# Patient Record
Sex: FEMALE | Race: WHITE | Employment: UNEMPLOYED | URBAN - METROPOLITAN AREA
[De-identification: names, ages, dates, MRNs, and addresses within clinical notes are randomized per-mention and may not be internally consistent; named-entity substitution may affect disease eponyms.]

---

## 2017-04-03 DIAGNOSIS — I10 ESSENTIAL HYPERTENSION: ICD-10-CM

## 2017-04-03 DIAGNOSIS — E78.2 MIXED HYPERLIPIDEMIA: ICD-10-CM

## 2017-04-03 DIAGNOSIS — E55.9 VITAMIN D DEFICIENCY: ICD-10-CM

## 2017-04-03 LAB
ALBUMIN SERPL-MCNC: 4.2 G/DL (ref 3.9–4.9)
ALP BLD-CCNC: 61 U/L (ref 40–130)
ALT SERPL-CCNC: 16 U/L (ref 0–33)
ANION GAP SERPL CALCULATED.3IONS-SCNC: 12 MEQ/L (ref 7–13)
AST SERPL-CCNC: 18 U/L (ref 0–35)
BILIRUB SERPL-MCNC: 0.3 MG/DL (ref 0–1.2)
BUN BLDV-MCNC: 26 MG/DL (ref 8–23)
CALCIUM SERPL-MCNC: 10.4 MG/DL (ref 8.6–10.2)
CHLORIDE BLD-SCNC: 98 MEQ/L (ref 98–107)
CHOLESTEROL, TOTAL: 251 MG/DL (ref 0–199)
CO2: 29 MEQ/L (ref 22–29)
CREAT SERPL-MCNC: 0.73 MG/DL (ref 0.5–0.9)
GFR AFRICAN AMERICAN: >60
GFR NON-AFRICAN AMERICAN: >60
GLOBULIN: 2.9 G/DL (ref 2.3–3.5)
GLUCOSE BLD-MCNC: 118 MG/DL (ref 74–109)
HCT VFR BLD CALC: 39.8 % (ref 37–47)
HDLC SERPL-MCNC: 53 MG/DL (ref 40–59)
HEMOGLOBIN: 13.4 G/DL (ref 12–16)
LDL CHOLESTEROL CALCULATED: 118 MG/DL (ref 0–129)
MCH RBC QN AUTO: 28.9 PG (ref 27–31.3)
MCHC RBC AUTO-ENTMCNC: 33.8 % (ref 33–37)
MCV RBC AUTO: 85.5 FL (ref 82–100)
PDW BLD-RTO: 14.6 % (ref 11.5–14.5)
PLATELET # BLD: 245 K/UL (ref 130–400)
POTASSIUM SERPL-SCNC: 4.9 MEQ/L (ref 3.5–5.1)
RBC # BLD: 4.65 M/UL (ref 4.2–5.4)
SODIUM BLD-SCNC: 139 MEQ/L (ref 132–144)
TOTAL PROTEIN: 7.1 G/DL (ref 6.4–8.1)
TRIGL SERPL-MCNC: 398 MG/DL (ref 0–200)
WBC # BLD: 9 K/UL (ref 4.8–10.8)

## 2017-04-03 RX ORDER — MULTIVIT-MIN/IRON/FOLIC ACID/K 18-600-40
CAPSULE ORAL
Qty: 90 CAPSULE | Refills: 3 | Status: SHIPPED | OUTPATIENT
Start: 2017-04-03 | End: 2019-03-07 | Stop reason: DRUGHIGH

## 2017-04-03 RX ORDER — LOSARTAN POTASSIUM 100 MG/1
TABLET ORAL
Qty: 90 TABLET | Refills: 3 | Status: SHIPPED | OUTPATIENT
Start: 2017-04-03 | End: 2018-04-02 | Stop reason: SDUPTHER

## 2017-04-11 ENCOUNTER — OFFICE VISIT (OUTPATIENT)
Dept: FAMILY MEDICINE CLINIC | Age: 62
End: 2017-04-11

## 2017-04-11 VITALS
RESPIRATION RATE: 18 BRPM | HEART RATE: 78 BPM | BODY MASS INDEX: 40.12 KG/M2 | DIASTOLIC BLOOD PRESSURE: 60 MMHG | SYSTOLIC BLOOD PRESSURE: 122 MMHG | HEIGHT: 62 IN | WEIGHT: 218 LBS | TEMPERATURE: 97.6 F

## 2017-04-11 DIAGNOSIS — E55.9 VITAMIN D DEFICIENCY: ICD-10-CM

## 2017-04-11 DIAGNOSIS — Z78.9 STATIN INTOLERANCE: ICD-10-CM

## 2017-04-11 DIAGNOSIS — E78.2 MIXED HYPERLIPIDEMIA: ICD-10-CM

## 2017-04-11 DIAGNOSIS — H81.13 BENIGN POSITIONAL VERTIGO, BILATERAL: ICD-10-CM

## 2017-04-11 DIAGNOSIS — I10 ESSENTIAL HYPERTENSION: Primary | ICD-10-CM

## 2017-04-11 DIAGNOSIS — I25.10 CHRONIC CORONARY ARTERY DISEASE: ICD-10-CM

## 2017-04-11 DIAGNOSIS — K21.9 GASTROESOPHAGEAL REFLUX DISEASE WITHOUT ESOPHAGITIS: ICD-10-CM

## 2017-04-11 PROCEDURE — 99214 OFFICE O/P EST MOD 30 MIN: CPT | Performed by: FAMILY MEDICINE

## 2017-04-11 RX ORDER — FAMOTIDINE 40 MG/1
40 TABLET, FILM COATED ORAL EVERY EVENING
Qty: 30 TABLET | Refills: 3 | Status: SHIPPED | OUTPATIENT
Start: 2017-04-11 | End: 2017-07-29 | Stop reason: SDUPTHER

## 2017-04-11 ASSESSMENT — PATIENT HEALTH QUESTIONNAIRE - PHQ9
1. LITTLE INTEREST OR PLEASURE IN DOING THINGS: 0
SUM OF ALL RESPONSES TO PHQ9 QUESTIONS 1 & 2: 0
SUM OF ALL RESPONSES TO PHQ QUESTIONS 1-9: 0
2. FEELING DOWN, DEPRESSED OR HOPELESS: 0

## 2017-04-18 ENCOUNTER — OFFICE VISIT (OUTPATIENT)
Dept: CARDIOLOGY | Age: 62
End: 2017-04-18

## 2017-04-18 VITALS
HEART RATE: 80 BPM | BODY MASS INDEX: 41.01 KG/M2 | WEIGHT: 222.85 LBS | OXYGEN SATURATION: 95 % | DIASTOLIC BLOOD PRESSURE: 84 MMHG | HEIGHT: 62 IN | SYSTOLIC BLOOD PRESSURE: 136 MMHG | RESPIRATION RATE: 12 BRPM

## 2017-04-18 DIAGNOSIS — I25.10 CHRONIC CORONARY ARTERY DISEASE: Primary | ICD-10-CM

## 2017-04-18 DIAGNOSIS — E78.2 MIXED HYPERLIPIDEMIA: ICD-10-CM

## 2017-04-18 DIAGNOSIS — R07.2 PRECORDIAL PAIN: ICD-10-CM

## 2017-04-18 DIAGNOSIS — I10 ESSENTIAL HYPERTENSION: ICD-10-CM

## 2017-04-18 DIAGNOSIS — E66.9 OBESITY (BMI 30-39.9): ICD-10-CM

## 2017-04-18 PROCEDURE — 93000 ELECTROCARDIOGRAM COMPLETE: CPT | Performed by: INTERNAL MEDICINE

## 2017-04-18 PROCEDURE — 99204 OFFICE O/P NEW MOD 45 MIN: CPT | Performed by: INTERNAL MEDICINE

## 2017-04-19 ASSESSMENT — ENCOUNTER SYMPTOMS
CONSTIPATION: 0
NAUSEA: 1
TROUBLE SWALLOWING: 0
CHEST TIGHTNESS: 0
COUGH: 0
BLOOD IN STOOL: 0
DIARRHEA: 0
SHORTNESS OF BREATH: 0
VOMITING: 0
ABDOMINAL PAIN: 0

## 2017-05-23 ENCOUNTER — HOSPITAL ENCOUNTER (OUTPATIENT)
Dept: NON INVASIVE DIAGNOSTICS | Age: 62
Discharge: HOME | End: 2017-05-23
Payer: COMMERCIAL

## 2017-05-23 VITALS — BODY MASS INDEX: 40.6 KG/M2 | WEIGHT: 222 LBS

## 2017-05-23 DIAGNOSIS — R07.2 PRECORDIAL PAIN: ICD-10-CM

## 2017-05-23 LAB
LV EF: 59 %
LVEF MODALITY: NORMAL

## 2017-05-23 PROCEDURE — 2580000003 HC RX 258

## 2017-05-23 PROCEDURE — 93017 CV STRESS TEST TRACING ONLY: CPT

## 2017-05-23 PROCEDURE — 93306 TTE W/DOPPLER COMPLETE: CPT

## 2017-05-23 RX ORDER — DOBUTAMINE HYDROCHLORIDE 100 MG/100ML
2.5 INJECTION INTRAVENOUS CONTINUOUS
Status: DISPENSED | OUTPATIENT
Start: 2017-05-23 | End: 2017-05-23

## 2017-05-26 DIAGNOSIS — I10 ESSENTIAL HYPERTENSION: ICD-10-CM

## 2017-05-26 RX ORDER — CHLORTHALIDONE 25 MG/1
TABLET ORAL
Qty: 90 TABLET | Refills: 3 | Status: SHIPPED | OUTPATIENT
Start: 2017-05-26 | End: 2018-06-05 | Stop reason: SDUPTHER

## 2017-05-30 ENCOUNTER — OFFICE VISIT (OUTPATIENT)
Dept: CARDIOLOGY | Age: 62
End: 2017-05-30

## 2017-05-30 VITALS
DIASTOLIC BLOOD PRESSURE: 60 MMHG | HEART RATE: 91 BPM | HEIGHT: 62 IN | BODY MASS INDEX: 40.85 KG/M2 | OXYGEN SATURATION: 98 % | WEIGHT: 222 LBS | SYSTOLIC BLOOD PRESSURE: 120 MMHG

## 2017-05-30 DIAGNOSIS — I10 ESSENTIAL HYPERTENSION: ICD-10-CM

## 2017-05-30 DIAGNOSIS — E66.9 OBESITY (BMI 30-39.9): Primary | ICD-10-CM

## 2017-05-30 DIAGNOSIS — I25.10 CHRONIC CORONARY ARTERY DISEASE: ICD-10-CM

## 2017-05-30 DIAGNOSIS — E78.2 MIXED HYPERLIPIDEMIA: ICD-10-CM

## 2017-05-30 PROCEDURE — 99213 OFFICE O/P EST LOW 20 MIN: CPT | Performed by: INTERNAL MEDICINE

## 2017-05-30 RX ORDER — CARVEDILOL 25 MG/1
25 TABLET ORAL 2 TIMES DAILY WITH MEALS
Qty: 60 TABLET | Refills: 5 | Status: SHIPPED | OUTPATIENT
Start: 2017-05-30 | End: 2017-11-30 | Stop reason: SDUPTHER

## 2017-06-07 ENCOUNTER — OFFICE VISIT (OUTPATIENT)
Dept: FAMILY MEDICINE CLINIC | Age: 62
End: 2017-06-07

## 2017-06-07 VITALS
WEIGHT: 222 LBS | TEMPERATURE: 97.8 F | RESPIRATION RATE: 18 BRPM | OXYGEN SATURATION: 98 % | DIASTOLIC BLOOD PRESSURE: 80 MMHG | BODY MASS INDEX: 40.85 KG/M2 | HEIGHT: 62 IN | SYSTOLIC BLOOD PRESSURE: 136 MMHG | HEART RATE: 93 BPM

## 2017-06-07 DIAGNOSIS — L02.01 ACUTE ABSCESS OF FACE: Primary | ICD-10-CM

## 2017-06-07 PROCEDURE — 99213 OFFICE O/P EST LOW 20 MIN: CPT | Performed by: NURSE PRACTITIONER

## 2017-06-07 RX ORDER — DOXYCYCLINE 100 MG/1
100 CAPSULE ORAL 2 TIMES DAILY
Qty: 20 CAPSULE | Refills: 0 | Status: SHIPPED | OUTPATIENT
Start: 2017-06-07 | End: 2017-06-17

## 2017-06-12 ENCOUNTER — OFFICE VISIT (OUTPATIENT)
Dept: SURGERY | Age: 62
End: 2017-06-12

## 2017-06-12 VITALS
DIASTOLIC BLOOD PRESSURE: 84 MMHG | BODY MASS INDEX: 40.3 KG/M2 | HEIGHT: 62 IN | TEMPERATURE: 98 F | SYSTOLIC BLOOD PRESSURE: 120 MMHG | WEIGHT: 219 LBS

## 2017-06-12 DIAGNOSIS — L08.9 UNSPECIFIED LOCAL INFECTION OF SKIN AND SUBCUTANEOUS TISSUE: ICD-10-CM

## 2017-06-12 DIAGNOSIS — L72.9 CYST OF SKIN: Primary | ICD-10-CM

## 2017-06-12 PROCEDURE — 99202 OFFICE O/P NEW SF 15 MIN: CPT | Performed by: SURGERY

## 2017-06-12 RX ORDER — DOXYCYCLINE HYCLATE 100 MG/1
CAPSULE ORAL
Qty: 28 CAPSULE | Refills: 0 | Status: SHIPPED | OUTPATIENT
Start: 2017-06-12 | End: 2017-08-09 | Stop reason: ALTCHOICE

## 2017-06-12 ASSESSMENT — ENCOUNTER SYMPTOMS
ABDOMINAL PAIN: 0
COLOR CHANGE: 0
RESPIRATORY NEGATIVE: 1
SHORTNESS OF BREATH: 0
BLOOD IN STOOL: 0
RHINORRHEA: 0
EYES NEGATIVE: 1
ABDOMINAL DISTENTION: 0
CHEST TIGHTNESS: 0
NAUSEA: 0
RECTAL PAIN: 0
ALLERGIC/IMMUNOLOGIC NEGATIVE: 1
GASTROINTESTINAL NEGATIVE: 1

## 2017-07-29 DIAGNOSIS — K21.9 GASTROESOPHAGEAL REFLUX DISEASE WITHOUT ESOPHAGITIS: ICD-10-CM

## 2017-07-29 RX ORDER — FAMOTIDINE 40 MG/1
TABLET, FILM COATED ORAL
Qty: 30 TABLET | Refills: 3 | Status: SHIPPED | OUTPATIENT
Start: 2017-07-29 | End: 2018-05-31 | Stop reason: ALTCHOICE

## 2017-08-04 DIAGNOSIS — R73.02 IMPAIRED GLUCOSE TOLERANCE: ICD-10-CM

## 2017-08-04 DIAGNOSIS — E55.9 VITAMIN D DEFICIENCY: ICD-10-CM

## 2017-08-04 DIAGNOSIS — E78.2 MIXED HYPERLIPIDEMIA: ICD-10-CM

## 2017-08-04 DIAGNOSIS — I10 ESSENTIAL HYPERTENSION: ICD-10-CM

## 2017-08-04 DIAGNOSIS — E78.2 MIXED HYPERLIPIDEMIA: Primary | ICD-10-CM

## 2017-08-04 LAB
ALBUMIN SERPL-MCNC: 4.3 G/DL (ref 3.9–4.9)
ALP BLD-CCNC: 59 U/L (ref 40–130)
ALT SERPL-CCNC: 24 U/L (ref 0–33)
ANION GAP SERPL CALCULATED.3IONS-SCNC: 18 MEQ/L (ref 7–13)
AST SERPL-CCNC: 21 U/L (ref 0–35)
BASOPHILS ABSOLUTE: 0.1 K/UL (ref 0–0.2)
BASOPHILS RELATIVE PERCENT: 0.7 %
BILIRUB SERPL-MCNC: 0.4 MG/DL (ref 0–1.2)
BUN BLDV-MCNC: 14 MG/DL (ref 8–23)
CALCIUM SERPL-MCNC: 10.1 MG/DL (ref 8.6–10.2)
CHLORIDE BLD-SCNC: 95 MEQ/L (ref 98–107)
CHOLESTEROL, TOTAL: 236 MG/DL (ref 0–199)
CO2: 26 MEQ/L (ref 22–29)
CREAT SERPL-MCNC: 0.67 MG/DL (ref 0.5–0.9)
EOSINOPHILS ABSOLUTE: 0.5 K/UL (ref 0–0.7)
EOSINOPHILS RELATIVE PERCENT: 4.3 %
GFR AFRICAN AMERICAN: >60
GFR NON-AFRICAN AMERICAN: >60
GLOBULIN: 2.7 G/DL (ref 2.3–3.5)
GLUCOSE BLD-MCNC: 131 MG/DL (ref 74–109)
HCT VFR BLD CALC: 40.1 % (ref 37–47)
HDLC SERPL-MCNC: 49 MG/DL (ref 40–59)
HEMOGLOBIN: 13.3 G/DL (ref 12–16)
LDL CHOLESTEROL CALCULATED: 121 MG/DL (ref 0–129)
LYMPHOCYTES ABSOLUTE: 3.3 K/UL (ref 1–4.8)
LYMPHOCYTES RELATIVE PERCENT: 30.6 %
MCH RBC QN AUTO: 28.9 PG (ref 27–31.3)
MCHC RBC AUTO-ENTMCNC: 33.2 % (ref 33–37)
MCV RBC AUTO: 87 FL (ref 82–100)
MONOCYTES ABSOLUTE: 0.8 K/UL (ref 0.2–0.8)
MONOCYTES RELATIVE PERCENT: 7.5 %
NEUTROPHILS ABSOLUTE: 6.1 K/UL (ref 1.4–6.5)
NEUTROPHILS RELATIVE PERCENT: 56.9 %
PDW BLD-RTO: 15.1 % (ref 11.5–14.5)
PLATELET # BLD: 252 K/UL (ref 130–400)
POTASSIUM SERPL-SCNC: 4.2 MEQ/L (ref 3.5–5.1)
RBC # BLD: 4.61 M/UL (ref 4.2–5.4)
SODIUM BLD-SCNC: 139 MEQ/L (ref 132–144)
TOTAL PROTEIN: 7 G/DL (ref 6.4–8.1)
TRIGL SERPL-MCNC: 332 MG/DL (ref 0–200)
VITAMIN D 25-HYDROXY: 37.6 NG/ML (ref 30–100)
WBC # BLD: 10.7 K/UL (ref 4.8–10.8)

## 2017-08-09 ENCOUNTER — TELEPHONE (OUTPATIENT)
Dept: FAMILY MEDICINE CLINIC | Age: 62
End: 2017-08-09

## 2017-08-09 ENCOUNTER — HOSPITAL ENCOUNTER (OUTPATIENT)
Dept: GENERAL RADIOLOGY | Age: 62
Discharge: HOME | End: 2017-08-09
Payer: COMMERCIAL

## 2017-08-09 ENCOUNTER — OFFICE VISIT (OUTPATIENT)
Dept: FAMILY MEDICINE CLINIC | Age: 62
End: 2017-08-09

## 2017-08-09 VITALS
HEIGHT: 62 IN | BODY MASS INDEX: 40.12 KG/M2 | WEIGHT: 218 LBS | HEART RATE: 72 BPM | RESPIRATION RATE: 18 BRPM | TEMPERATURE: 99.1 F | SYSTOLIC BLOOD PRESSURE: 110 MMHG | DIASTOLIC BLOOD PRESSURE: 64 MMHG

## 2017-08-09 DIAGNOSIS — I10 ESSENTIAL HYPERTENSION: Primary | ICD-10-CM

## 2017-08-09 DIAGNOSIS — G47.33 OBSTRUCTIVE SLEEP APNEA SYNDROME: ICD-10-CM

## 2017-08-09 DIAGNOSIS — G89.29 CHRONIC MIDLINE LOW BACK PAIN WITH LEFT-SIDED SCIATICA: ICD-10-CM

## 2017-08-09 DIAGNOSIS — M54.42 CHRONIC MIDLINE LOW BACK PAIN WITH LEFT-SIDED SCIATICA: ICD-10-CM

## 2017-08-09 DIAGNOSIS — R73.9 HYPERGLYCEMIA: ICD-10-CM

## 2017-08-09 DIAGNOSIS — E78.2 MIXED HYPERLIPIDEMIA: ICD-10-CM

## 2017-08-09 DIAGNOSIS — Z12.39 SCREENING FOR BREAST CANCER: Primary | ICD-10-CM

## 2017-08-09 PROCEDURE — 72110 X-RAY EXAM L-2 SPINE 4/>VWS: CPT

## 2017-08-09 PROCEDURE — 99214 OFFICE O/P EST MOD 30 MIN: CPT | Performed by: FAMILY MEDICINE

## 2017-10-26 ENCOUNTER — OFFICE VISIT (OUTPATIENT)
Dept: FAMILY MEDICINE CLINIC | Age: 62
End: 2017-10-26

## 2017-10-26 VITALS
DIASTOLIC BLOOD PRESSURE: 82 MMHG | TEMPERATURE: 98.5 F | HEART RATE: 80 BPM | WEIGHT: 211 LBS | BODY MASS INDEX: 38.59 KG/M2 | RESPIRATION RATE: 14 BRPM | SYSTOLIC BLOOD PRESSURE: 122 MMHG

## 2017-10-26 DIAGNOSIS — Z13.1 ENCOUNTER FOR SCREENING FOR DIABETES MELLITUS: ICD-10-CM

## 2017-10-26 DIAGNOSIS — Z23 NEED FOR INFLUENZA VACCINATION: ICD-10-CM

## 2017-10-26 DIAGNOSIS — L03.311 CELLULITIS OF ABDOMINAL WALL: Primary | ICD-10-CM

## 2017-10-26 LAB — HBA1C MFR BLD: 5 %

## 2017-10-26 PROCEDURE — 3017F COLORECTAL CA SCREEN DOC REV: CPT | Performed by: INTERNAL MEDICINE

## 2017-10-26 PROCEDURE — 90688 IIV4 VACCINE SPLT 0.5 ML IM: CPT | Performed by: INTERNAL MEDICINE

## 2017-10-26 PROCEDURE — 1036F TOBACCO NON-USER: CPT | Performed by: INTERNAL MEDICINE

## 2017-10-26 PROCEDURE — 99213 OFFICE O/P EST LOW 20 MIN: CPT | Performed by: INTERNAL MEDICINE

## 2017-10-26 PROCEDURE — 3014F SCREEN MAMMO DOC REV: CPT | Performed by: INTERNAL MEDICINE

## 2017-10-26 PROCEDURE — G8598 ASA/ANTIPLAT THER USED: HCPCS | Performed by: INTERNAL MEDICINE

## 2017-10-26 PROCEDURE — G8427 DOCREV CUR MEDS BY ELIG CLIN: HCPCS | Performed by: INTERNAL MEDICINE

## 2017-10-26 PROCEDURE — G8484 FLU IMMUNIZE NO ADMIN: HCPCS | Performed by: INTERNAL MEDICINE

## 2017-10-26 PROCEDURE — 83036 HEMOGLOBIN GLYCOSYLATED A1C: CPT | Performed by: INTERNAL MEDICINE

## 2017-10-26 PROCEDURE — G8417 CALC BMI ABV UP PARAM F/U: HCPCS | Performed by: INTERNAL MEDICINE

## 2017-10-26 PROCEDURE — 90471 IMMUNIZATION ADMIN: CPT | Performed by: INTERNAL MEDICINE

## 2017-10-26 RX ORDER — DOXYCYCLINE HYCLATE 100 MG
100 TABLET ORAL 2 TIMES DAILY
Qty: 14 TABLET | Refills: 0 | Status: SHIPPED | OUTPATIENT
Start: 2017-10-26 | End: 2017-11-02

## 2017-10-26 ASSESSMENT — ENCOUNTER SYMPTOMS
SHORTNESS OF BREATH: 0
WHEEZING: 0
BLOOD IN STOOL: 0
DIARRHEA: 0
ABDOMINAL PAIN: 0
COUGH: 0
VOICE CHANGE: 0
NAUSEA: 0
COLOR CHANGE: 0
BACK PAIN: 0
ABDOMINAL DISTENTION: 0
PHOTOPHOBIA: 0
SORE THROAT: 0
EYE DISCHARGE: 0
EYE ITCHING: 0
EYE PAIN: 0
EYE REDNESS: 0
TROUBLE SWALLOWING: 0
CONSTIPATION: 0

## 2017-10-26 NOTE — PROGRESS NOTES
Adhesive tape  Patient Active Problem List   Diagnosis    Vitamin D deficiency    Tear of lateral cartilage or meniscus of knee, current    Status post coronary artery balloon dilation    Surgery follow-up examination    Seborrheic keratosis    Obstructive sleep apnea syndrome    Obesity (BMI 30-39. 9)    Long term current use of anticoagulant therapy    Hyperlipidemia    Hypertension    Impaired glucose tolerance    Gastroesophageal reflux disease    Factor V Leiden mutation ([facility] [address]    Essential tremor    Stricture of esophagus    Deep vein thrombosis of lower extremity (HCC)    Chronic coronary artery disease    Benign paroxysmal positional vertigo    Benign neoplasm of scalp and skin of neck    Gastroesophageal reflux disease without esophagitis    Cyst of skin    Local infection of skin and subcutaneous tissue     Current Outpatient Prescriptions on File Prior to Visit   Medication Sig Dispense Refill    famotidine (PEPCID) 40 MG tablet TAKE ONE TABLET BY MOUTH ONCE DAILY IN THE EVENING 30 tablet 3    Multiple Vitamin (MULTI VITAMIN PO) Take by mouth      carvedilol (COREG) 25 MG tablet Take 1 tablet by mouth 2 times daily (with meals) 60 tablet 5    chlorthalidone (HYGROTON) 25 MG tablet TAKE ONE TABLET BY MOUTH ONCE DAILY 90 tablet 3    TURMERIC PO Take by mouth      aspirin 81 MG tablet Take 81 mg by mouth daily      losartan (COZAAR) 100 MG tablet TAKE ONE TABLET BY MOUTH ONCE DAILY 90 tablet 3    Cholecalciferol (VITAMIN D) 2000 UNITS CAPS capsule TAKE ONE SOFTGEL BY MOUTH ONCE DAILY 90 capsule 3    nitroGLYCERIN (NITROSTAT) 0.4 MG SL tablet Place 1 tablet under the tongue every 5 minutes as needed for Chest pain 25 tablet 3     No current facility-administered medications on file prior to visit. Review of Systems   Constitutional: Negative for activity change, appetite change, chills, diaphoresis, fatigue, fever and unexpected weight change.    HENT: Negative for (VIBRA-TABS) 100 MG tablet   2. Need for influenza vaccination  INFLUENZA, QUADV, 3 YRS AND OLDER, IM, MDV, 0.5ML ([facility])   3. Encounter for screening for diabetes mellitus  POCT glycosylated hemoglobin (Hb A1C)    CANCELED: Glucose, Fasting    CANCELED: Hemoglobin A1C - NOT COVERED /DO NOT USE FOR MEDICARE PATIENTS         Plan:      Orders Placed This Encounter   Procedures    INFLUENZA, QUADV, 3 YRS AND OLDER, IM, MDV, 0.5ML (FLUZONE QUADV)    POCT glycosylated hemoglobin (Hb A1C)     Orders Placed This Encounter   Medications    doxycycline hyclate (VIBRA-TABS) 100 MG tablet     Sig: Take 1 tablet by mouth 2 times daily for 7 days     Dispense:  14 tablet     Refill:  0     Results for POC orders placed in visit on 10/26/17   POCT glycosylated hemoglobin (Hb A1C)   Result Value Ref Range    Hemoglobin A1C 5.0 %       Return if symptoms worsen or fail to improve.

## 2017-11-09 DIAGNOSIS — I10 ESSENTIAL HYPERTENSION: ICD-10-CM

## 2017-11-09 DIAGNOSIS — E78.2 MIXED HYPERLIPIDEMIA: ICD-10-CM

## 2017-11-09 LAB
ALBUMIN SERPL-MCNC: 4.2 G/DL (ref 3.9–4.9)
ALP BLD-CCNC: 62 U/L (ref 40–130)
ALT SERPL-CCNC: 36 U/L (ref 0–33)
ANION GAP SERPL CALCULATED.3IONS-SCNC: 14 MEQ/L (ref 7–13)
AST SERPL-CCNC: 33 U/L (ref 0–35)
BASOPHILS ABSOLUTE: 0.1 K/UL (ref 0–0.2)
BASOPHILS RELATIVE PERCENT: 0.7 %
BILIRUB SERPL-MCNC: 0.4 MG/DL (ref 0–1.2)
BUN BLDV-MCNC: 16 MG/DL (ref 8–23)
CALCIUM SERPL-MCNC: 10.3 MG/DL (ref 8.6–10.2)
CHLORIDE BLD-SCNC: 99 MEQ/L (ref 98–107)
CHOLESTEROL, TOTAL: 220 MG/DL (ref 0–199)
CO2: 30 MEQ/L (ref 22–29)
CREAT SERPL-MCNC: 0.59 MG/DL (ref 0.5–0.9)
EOSINOPHILS ABSOLUTE: 0.4 K/UL (ref 0–0.7)
EOSINOPHILS RELATIVE PERCENT: 4.2 %
GFR AFRICAN AMERICAN: >60
GFR NON-AFRICAN AMERICAN: >60
GLOBULIN: 2.9 G/DL (ref 2.3–3.5)
GLUCOSE BLD-MCNC: 119 MG/DL (ref 74–109)
HCT VFR BLD CALC: 39.6 % (ref 37–47)
HDLC SERPL-MCNC: 47 MG/DL (ref 40–59)
HEMOGLOBIN: 13.3 G/DL (ref 12–16)
LDL CHOLESTEROL CALCULATED: 118 MG/DL (ref 0–129)
LYMPHOCYTES ABSOLUTE: 3.7 K/UL (ref 1–4.8)
LYMPHOCYTES RELATIVE PERCENT: 41.1 %
MCH RBC QN AUTO: 30 PG (ref 27–31.3)
MCHC RBC AUTO-ENTMCNC: 33.7 % (ref 33–37)
MCV RBC AUTO: 89.1 FL (ref 82–100)
MONOCYTES ABSOLUTE: 0.7 K/UL (ref 0.2–0.8)
MONOCYTES RELATIVE PERCENT: 7.6 %
NEUTROPHILS ABSOLUTE: 4.2 K/UL (ref 1.4–6.5)
NEUTROPHILS RELATIVE PERCENT: 46.4 %
PDW BLD-RTO: 14.8 % (ref 11.5–14.5)
PLATELET # BLD: 266 K/UL (ref 130–400)
POTASSIUM SERPL-SCNC: 4.2 MEQ/L (ref 3.5–5.1)
RBC # BLD: 4.44 M/UL (ref 4.2–5.4)
SODIUM BLD-SCNC: 143 MEQ/L (ref 132–144)
TOTAL PROTEIN: 7.1 G/DL (ref 6.4–8.1)
TRIGL SERPL-MCNC: 276 MG/DL (ref 0–200)
WBC # BLD: 9.1 K/UL (ref 4.8–10.8)

## 2017-11-13 ENCOUNTER — OFFICE VISIT (OUTPATIENT)
Dept: FAMILY MEDICINE CLINIC | Age: 62
End: 2017-11-13

## 2017-11-13 VITALS
BODY MASS INDEX: 38.59 KG/M2 | RESPIRATION RATE: 18 BRPM | WEIGHT: 211 LBS | HEART RATE: 84 BPM | SYSTOLIC BLOOD PRESSURE: 136 MMHG | TEMPERATURE: 98.8 F | DIASTOLIC BLOOD PRESSURE: 82 MMHG

## 2017-11-13 DIAGNOSIS — L98.491 ULCER OF ABDOMEN WALL, LIMITED TO BREAKDOWN OF SKIN (HCC): ICD-10-CM

## 2017-11-13 DIAGNOSIS — K21.9 GASTROESOPHAGEAL REFLUX DISEASE WITHOUT ESOPHAGITIS: ICD-10-CM

## 2017-11-13 DIAGNOSIS — E78.2 MIXED HYPERLIPIDEMIA: ICD-10-CM

## 2017-11-13 DIAGNOSIS — I10 ESSENTIAL HYPERTENSION: Primary | ICD-10-CM

## 2017-11-13 PROCEDURE — 3014F SCREEN MAMMO DOC REV: CPT | Performed by: FAMILY MEDICINE

## 2017-11-13 PROCEDURE — 3017F COLORECTAL CA SCREEN DOC REV: CPT | Performed by: FAMILY MEDICINE

## 2017-11-13 PROCEDURE — G8484 FLU IMMUNIZE NO ADMIN: HCPCS | Performed by: FAMILY MEDICINE

## 2017-11-13 PROCEDURE — G8417 CALC BMI ABV UP PARAM F/U: HCPCS | Performed by: FAMILY MEDICINE

## 2017-11-13 PROCEDURE — 99214 OFFICE O/P EST MOD 30 MIN: CPT | Performed by: FAMILY MEDICINE

## 2017-11-13 PROCEDURE — G8427 DOCREV CUR MEDS BY ELIG CLIN: HCPCS | Performed by: FAMILY MEDICINE

## 2017-11-13 PROCEDURE — G8598 ASA/ANTIPLAT THER USED: HCPCS | Performed by: FAMILY MEDICINE

## 2017-11-13 PROCEDURE — 1036F TOBACCO NON-USER: CPT | Performed by: FAMILY MEDICINE

## 2017-11-13 NOTE — PROGRESS NOTES
edema in the four extremities. Pulses palpable at both posterior tibial and radial arteries. Psychiatric: She has a normal mood and affect. Her behavior is normal.       DIAGNOSIS:   1. Essential hypertension      Well controlled, continue current medication. 2. Mixed hyperlipidemia      Poor control, improved diet and exercise. 3. Gastroesophageal reflux disease without esophagitis      Well controlled, continue current medication. 4. Ulcer of abdomen wall, limited to breakdown of skin ([address]      Continue treatment, if symptoms don't improve may need wound center referral.     Plan for follow up: Follow up in 2 weeks for wound recheck. Other follow up as needed.       Electronically signed by Anay Beckman, 9:55 PM 11/15/17

## 2017-11-15 PROBLEM — L08.9 LOCAL INFECTION OF SKIN AND SUBCUTANEOUS TISSUE: Status: RESOLVED | Noted: 2017-06-12 | Resolved: 2017-11-15

## 2017-11-15 PROBLEM — L72.9 CYST OF SKIN: Status: RESOLVED | Noted: 2017-06-12 | Resolved: 2017-11-15

## 2017-11-27 ENCOUNTER — OFFICE VISIT (OUTPATIENT)
Dept: FAMILY MEDICINE CLINIC | Age: 62
End: 2017-11-27

## 2017-11-27 VITALS
DIASTOLIC BLOOD PRESSURE: 92 MMHG | BODY MASS INDEX: 38.41 KG/M2 | TEMPERATURE: 99 F | SYSTOLIC BLOOD PRESSURE: 170 MMHG | HEART RATE: 78 BPM | RESPIRATION RATE: 18 BRPM | WEIGHT: 210 LBS

## 2017-11-27 DIAGNOSIS — I10 ESSENTIAL HYPERTENSION: ICD-10-CM

## 2017-11-27 DIAGNOSIS — L98.491 ULCER OF ABDOMEN WALL, LIMITED TO BREAKDOWN OF SKIN (HCC): Primary | ICD-10-CM

## 2017-11-27 PROCEDURE — 1036F TOBACCO NON-USER: CPT | Performed by: FAMILY MEDICINE

## 2017-11-27 PROCEDURE — 3014F SCREEN MAMMO DOC REV: CPT | Performed by: FAMILY MEDICINE

## 2017-11-27 PROCEDURE — G8427 DOCREV CUR MEDS BY ELIG CLIN: HCPCS | Performed by: FAMILY MEDICINE

## 2017-11-27 PROCEDURE — 3017F COLORECTAL CA SCREEN DOC REV: CPT | Performed by: FAMILY MEDICINE

## 2017-11-27 PROCEDURE — G8417 CALC BMI ABV UP PARAM F/U: HCPCS | Performed by: FAMILY MEDICINE

## 2017-11-27 PROCEDURE — G8598 ASA/ANTIPLAT THER USED: HCPCS | Performed by: FAMILY MEDICINE

## 2017-11-27 PROCEDURE — 99213 OFFICE O/P EST LOW 20 MIN: CPT | Performed by: FAMILY MEDICINE

## 2017-11-27 PROCEDURE — G8484 FLU IMMUNIZE NO ADMIN: HCPCS | Performed by: FAMILY MEDICINE

## 2017-11-27 NOTE — PROGRESS NOTES
Chief Complaint   Patient presents with    Wound Check       HPI: Jie Farmer is a 60 y.o. female presenting for 2 week follow-up of wound on the inferior anterior abdominal wall. It is getting smaller. She says this is hurting and seeping. Her blood pressure is elevated. This patient reports no chest pains or pressure. There is no shortness of breath or chest wall soreness. EXAM:  Constitutional Blood pressure (!) 170/92, pulse 78, temperature 99 °F (37.2 °C), temperature source Temporal, resp. rate 18, weight 210 lb (95.3 kg). .   Physical Exam   Constitutional: She is oriented to person, place, and time. She appears well-developed and well-nourished. Cardiovascular: Normal rate, regular rhythm and normal heart sounds. Pulmonary/Chest: Effort normal and breath sounds normal.   Neurological: She is alert and oriented to person, place, and time. Skin:   Roughly round shaped skin ulcer abdominal wall, skin thickness. There is an eschar in the middle that is beginning to peel away. There is no induration or erythema. DIAGNOSIS:   1. Ulcer of abdomen wall, limited to breakdown of skin ([address]      Slowly improving, continue to monitor. 2. Essential hypertension      Poor control today, continue current medicines (patient has not had her medicine yet today). Monitor at home. Plan for follow up: Follow up in 6 weeks with blood work as ordered. Other follow up as needed.       Electronically signed by Mariajose Melchor, 9:37 PM 11/27/17

## 2017-11-30 ENCOUNTER — OFFICE VISIT (OUTPATIENT)
Dept: CARDIOLOGY | Age: 62
End: 2017-11-30

## 2017-11-30 VITALS
RESPIRATION RATE: 14 BRPM | OXYGEN SATURATION: 99 % | BODY MASS INDEX: 38.96 KG/M2 | HEART RATE: 86 BPM | DIASTOLIC BLOOD PRESSURE: 82 MMHG | WEIGHT: 213 LBS | SYSTOLIC BLOOD PRESSURE: 138 MMHG

## 2017-11-30 DIAGNOSIS — E66.9 OBESITY (BMI 30-39.9): ICD-10-CM

## 2017-11-30 DIAGNOSIS — Z95.5 STATUS POST CORONARY ARTERY BALLOON DILATION: ICD-10-CM

## 2017-11-30 DIAGNOSIS — I10 ESSENTIAL HYPERTENSION: ICD-10-CM

## 2017-11-30 DIAGNOSIS — E78.2 MIXED HYPERLIPIDEMIA: ICD-10-CM

## 2017-11-30 DIAGNOSIS — I25.10 CHRONIC CORONARY ARTERY DISEASE: Primary | ICD-10-CM

## 2017-11-30 DIAGNOSIS — G47.33 OBSTRUCTIVE SLEEP APNEA SYNDROME: ICD-10-CM

## 2017-11-30 PROCEDURE — 1036F TOBACCO NON-USER: CPT | Performed by: INTERNAL MEDICINE

## 2017-11-30 PROCEDURE — G8417 CALC BMI ABV UP PARAM F/U: HCPCS | Performed by: INTERNAL MEDICINE

## 2017-11-30 PROCEDURE — 93000 ELECTROCARDIOGRAM COMPLETE: CPT | Performed by: INTERNAL MEDICINE

## 2017-11-30 PROCEDURE — G8427 DOCREV CUR MEDS BY ELIG CLIN: HCPCS | Performed by: INTERNAL MEDICINE

## 2017-11-30 PROCEDURE — 99213 OFFICE O/P EST LOW 20 MIN: CPT | Performed by: INTERNAL MEDICINE

## 2017-11-30 PROCEDURE — G8484 FLU IMMUNIZE NO ADMIN: HCPCS | Performed by: INTERNAL MEDICINE

## 2017-11-30 PROCEDURE — 3017F COLORECTAL CA SCREEN DOC REV: CPT | Performed by: INTERNAL MEDICINE

## 2017-11-30 PROCEDURE — 3014F SCREEN MAMMO DOC REV: CPT | Performed by: INTERNAL MEDICINE

## 2017-11-30 PROCEDURE — G8598 ASA/ANTIPLAT THER USED: HCPCS | Performed by: INTERNAL MEDICINE

## 2017-11-30 RX ORDER — CARVEDILOL 25 MG/1
TABLET ORAL
Qty: 180 TABLET | Refills: 2 | Status: SHIPPED | OUTPATIENT
Start: 2017-11-30 | End: 2018-09-04 | Stop reason: SDUPTHER

## 2017-11-30 NOTE — PROGRESS NOTES
Chief Complaint   Patient presents with    6 Month Follow-Up    Coronary Artery Disease       Patient presents for initial medical evaluation. Patient is followed on a regular basis by Dr. Katie Perez MD. Hx of CAD s/p PCI in 2002 at [facility]. Has not seen a cardiologist for 5 yrs. NO recent stress test. States she had lower anterior rib pain the other day, does have some atypical CP here and there per patient. No smoking. She does have SOB with moderate exertion. Does have LE edema as well. compliant with meds. Pt denies  dyspnea,  change in exercise capacity, fatigue,  nausea, vomiting, diarrhea, constipation, motor weakness, insomnia, weight loss, syncope, dizziness, lightheadedness, palpitations, PND, orthopnea. States she has b/l leg pain all over. States she has bad knees. Does have a hx of statin intolerance. Has tried lipitor, zocor and Pracachol per patient. Does have a hx of DVT and factor V leiden and is on [address] [place], [place] [address]. 5-30-17:states her symptoms have resolved. Does have SOB real bad she states however. Pt denies chest pain, , change in exercise capacity, fatigue,  nausea, vomiting, diarrhea, constipation, motor weakness, insomnia, weight loss, syncope, dizziness, lightheadedness, palpitations, PND, orthopnea, or claudication. No nitro use. BP and hr are good. CAD is stable. No LE discoloration or ulcers. No LE edema. No CHF type symptoms. Lipid profile is normal. Tolerating livalo ok so far. States she is tired and would like to get off inderal.     S/p DSE:     RESULTS: Resting EKG revealed heart rate of 81 beats per minute. No  diagnostic ST-T wave changes were seen. Frequent PVCs were noted. At peak  infusion of IV dobutamine. Blood pressure response was hypertensive. Maximum  systolic blood pressure 010 mmHg, heart rate response was blunted. Maximum  heart rate was 107 beats per minute.  This may have been due to beta blocker  therapy.      RESULTS: Resting cardiogram showed normal valvular structures. Left  ventricular ejection fraction normal at 60%. Pericardium is normal.   Periaorta is normal. At peak infusion, IV dobutamine, there was normal  augmentation contractility of left ventricle without evidence of infarcted or  ischemic myocardium. There was incremental left ventricular ejection fraction  from 60% to 80% at peak infusion with IV dobutamine.     IMPRESSION:   1. Blunted heart response secondary to beta blocker therapy. 2. No definite evidence of infarcted or ischemic myocardium. 3. Augmentation of left ventricular fraction from 60% to 80% with exercise. 4. Frequent PVCs were noted. 5. Hypertensive response to IV Dobutamine.     11-30-17: Pt denies chest pain, dyspnea, dyspnea on exertion, change in exercise capacity, fatigue,  nausea, vomiting, diarrhea, constipation, motor weakness, insomnia, weight loss, syncope, dizziness, lightheadedness, palpitations, PND, orthopnea, or claudication. No nitro use. BP and hr are good. CAD is stable. No LE discoloration or ulcers. No LE edema. No CHF type symptoms. Lipid profile is normal. She did not tolerate Livalo as well, taking tumeric. No smoking. Felt better after taken off of inderal and switched to coreg. No nitro use. BP and hr are good. CAD is stable. No LE discoloration or ulcers. No LE edema. No CHF type symptoms. Lipid profile is abnormal. Non complaint with CPAP. Patient Active Problem List   Diagnosis    Vitamin D deficiency    Status post coronary artery balloon dilation    Obstructive sleep apnea syndrome    Obesity (BMI 30-39. 9)    Long term current use of anticoagulant therapy    Hyperlipidemia    Hypertension    Gastroesophageal reflux disease    Factor V Leiden mutation ([address]    Essential tremor    Stricture of esophagus    Deep vein thrombosis of lower extremity (HCC)    Chronic coronary artery disease    Gastroesophageal reflux disease without esophagitis       Past Surgical History:   Procedure Laterality Date    CHOLECYSTECTOMY      CORONARY ANGIOPLASTY WITH STENT PLACEMENT      CYST REMOVAL      top of mouth, benign    HYSTERECTOMY      KNEE ARTHROSCOPY Bilateral     TONSILLECTOMY         Social History     Social History    Marital status:      Spouse name: N/A    Number of children: N/A    Years of education: N/A     Social History Main Topics    Smoking status: Never Smoker    Smokeless tobacco: Never Used    Alcohol use No    Drug use: Unknown    Sexual activity: Not Asked     Other Topics Concern    None     Social History Narrative    None       Family History   Problem Relation Age of Onset    High Blood Pressure Mother     High Cholesterol Mother     Heart Disease Mother     High Blood Pressure Father     High Cholesterol Father     Heart Disease Brother     Arthritis Brother     Heart Disease Sister     Heart Disease Sister        Current Outpatient Prescriptions   Medication Sig Dispense Refill    famotidine (PEPCID) 40 MG tablet TAKE ONE TABLET BY MOUTH ONCE DAILY IN THE EVENING 30 tablet 3    Multiple Vitamin (MULTI VITAMIN PO) Take by mouth      carvedilol (COREG) 25 MG tablet Take 1 tablet by mouth 2 times daily (with meals) 60 tablet 5    chlorthalidone (HYGROTON) 25 MG tablet TAKE ONE TABLET BY MOUTH ONCE DAILY 90 tablet 3    TURMERIC PO Take by mouth      aspirin 81 MG tablet Take 81 mg by mouth daily      losartan (COZAAR) 100 MG tablet TAKE ONE TABLET BY MOUTH ONCE DAILY 90 tablet 3    Cholecalciferol (VITAMIN D) 2000 UNITS CAPS capsule TAKE ONE SOFTGEL BY MOUTH ONCE DAILY 90 capsule 3    nitroGLYCERIN (NITROSTAT) 0.4 MG SL tablet Place 1 tablet under the tongue every 5 minutes as needed for Chest pain 25 tablet 3     No current facility-administered medications for this visit. Latex; Amlodipine; Atorvastatin; Cephalexin; Ciprofloxacin; Codeine; Livalo [pitavastatin];  Metoclopramide; Pravastatin; Simvastatin; Sulfa antibiotics; and Adhesive tape    Review of Systems:  General ROS: negative  Psychological ROS: negative  Hematological and Lymphatic ROS: No history of blood clots or bleeding disorder. Respiratory ROS: no cough, shortness of breath, or wheezing  Cardiovascular ROS: no chest pain or dyspnea on exertion  Gastrointestinal ROS: no abdominal pain, change in bowel habits, or black or bloody stools  Genito-Urinary ROS: no dysuria, trouble voiding, or hematuria  Musculoskeletal ROS: negative  Neurological ROS: no TIA or stroke symptoms  Dermatological ROS: negative    VITALS:  Blood pressure 138/82, pulse 86, resp. rate 14, weight 213 lb (96.6 kg), SpO2 99 %. Body mass index is 38.96 kg/m². Physical Examination:  General appearance - alert, well appearing, and in no distress  Mental status - alert, oriented to person, place, and time  Neck - Neck is supple, no JVD or carotid bruits. No thyromegaly or adenopathy. Chest - clear to auscultation, no wheezes, rales or rhonchi, symmetric air entry  Heart - normal rate, regular rhythm, normal S1, S2, no murmurs, rubs, clicks or gallops  Abdomen - soft, nontender, nondistended, no masses or organomegaly  Neurological - alert, oriented, normal speech, no focal findings or movement disorder noted  Extremities - peripheral pulses normal, no pedal edema, no clubbing or cyanosis  Skin - normal coloration and turgor, no rashes, no suspicious skin lesions noted      EKG: normal sinus rhythm, nonspecific ST and T waves changes    Orders Placed This Encounter   Procedures    EKG 12 Lead       ASSESSMENT:    1. Chronic coronary artery disease  EKG 12 Lead   2. Status post coronary artery balloon dilation     3. Obesity (BMI 30-39.9)     4. Obstructive sleep apnea syndrome     5. Essential hypertension     6.  Mixed hyperlipidemia           PLAN:     Patient will need to continue to follow up with you for their general medical care    As always, aggressive risk factor modification is strongly recommended. We should adhere to the [address] guidelines for HTN management and the NCEP ATP III guidelines for LDL-C management. Cardiac diet is always recommended with low fat, cholesterol, calories and sodium. Continue medications at current doses. Does not want Repatha. CPAP qhs    Weight loss discussed. Patient was advised and encouraged to check blood pressure at home or at a pharmacy, maintain a logbook, and also call us back if blood pressure are above the target ranges or if it is low. Patient clearly understands and agrees to the instructions. We will need to continue to monitor muscle and liver enzymes, BUN, CR, and electrolytes. Details of medical condition explained and patient was warned about adverse consequences of uncontrolled medical conditions and possible side effects of prescribed medications.

## 2018-05-16 ENCOUNTER — HOSPITAL ENCOUNTER (OUTPATIENT)
Dept: GENERAL RADIOLOGY | Age: 63
Discharge: HOME | End: 2018-05-18
Payer: COMMERCIAL

## 2018-05-16 ENCOUNTER — OFFICE VISIT (OUTPATIENT)
Dept: FAMILY MEDICINE CLINIC | Age: 63
End: 2018-05-16
Payer: COMMERCIAL

## 2018-05-16 VITALS
WEIGHT: 208.8 LBS | BODY MASS INDEX: 38.42 KG/M2 | RESPIRATION RATE: 12 BRPM | HEIGHT: 62 IN | SYSTOLIC BLOOD PRESSURE: 124 MMHG | DIASTOLIC BLOOD PRESSURE: 76 MMHG | TEMPERATURE: 98.8 F | HEART RATE: 84 BPM | OXYGEN SATURATION: 98 %

## 2018-05-16 DIAGNOSIS — M54.41 ACUTE RIGHT-SIDED LOW BACK PAIN WITH RIGHT-SIDED SCIATICA: ICD-10-CM

## 2018-05-16 DIAGNOSIS — M54.41 ACUTE RIGHT-SIDED LOW BACK PAIN WITH RIGHT-SIDED SCIATICA: Primary | ICD-10-CM

## 2018-05-16 PROCEDURE — 99213 OFFICE O/P EST LOW 20 MIN: CPT | Performed by: FAMILY MEDICINE

## 2018-05-16 PROCEDURE — 72110 X-RAY EXAM L-2 SPINE 4/>VWS: CPT

## 2018-05-31 ENCOUNTER — OFFICE VISIT (OUTPATIENT)
Dept: CARDIOLOGY CLINIC | Age: 63
End: 2018-05-31
Payer: COMMERCIAL

## 2018-05-31 VITALS
HEIGHT: 62 IN | WEIGHT: 209.4 LBS | BODY MASS INDEX: 38.53 KG/M2 | DIASTOLIC BLOOD PRESSURE: 70 MMHG | HEART RATE: 82 BPM | SYSTOLIC BLOOD PRESSURE: 120 MMHG | OXYGEN SATURATION: 97 %

## 2018-05-31 DIAGNOSIS — E66.9 OBESITY (BMI 30-39.9): ICD-10-CM

## 2018-05-31 DIAGNOSIS — Z78.9 STATIN INTOLERANCE: ICD-10-CM

## 2018-05-31 DIAGNOSIS — E78.2 MIXED HYPERLIPIDEMIA: Primary | ICD-10-CM

## 2018-05-31 DIAGNOSIS — I10 ESSENTIAL HYPERTENSION: ICD-10-CM

## 2018-05-31 DIAGNOSIS — G47.33 OBSTRUCTIVE SLEEP APNEA SYNDROME: ICD-10-CM

## 2018-05-31 PROCEDURE — 99213 OFFICE O/P EST LOW 20 MIN: CPT | Performed by: INTERNAL MEDICINE

## 2018-06-05 DIAGNOSIS — I10 ESSENTIAL HYPERTENSION: ICD-10-CM

## 2018-06-05 RX ORDER — CHLORTHALIDONE 25 MG/1
TABLET ORAL
Qty: 90 TABLET | Refills: 3 | Status: SHIPPED | OUTPATIENT
Start: 2018-06-05 | End: 2021-08-24 | Stop reason: SDUPTHER

## 2018-07-02 DIAGNOSIS — I10 ESSENTIAL HYPERTENSION: ICD-10-CM

## 2018-07-02 RX ORDER — LOSARTAN POTASSIUM 100 MG/1
TABLET ORAL
Qty: 90 TABLET | Refills: 1 | Status: SHIPPED | OUTPATIENT
Start: 2018-07-02 | End: 2019-01-03 | Stop reason: SDUPTHER

## 2018-07-02 NOTE — TELEPHONE ENCOUNTER
Pharmacy requests refill on medication. Please approve or deny this request.  Last seen by you on 5/16/2018.     Future Appointments  Date Time Provider [place] Hinson   11/16/2018 9:30 AM Adin Orozco MD [facility]   3/7/2019 8:00 AM DO Rosibel Hodges [id number]

## 2018-09-06 RX ORDER — CARVEDILOL 25 MG/1
TABLET ORAL
Qty: 180 TABLET | Refills: 2 | Status: SHIPPED | OUTPATIENT
Start: 2018-09-06 | End: 2019-03-07 | Stop reason: DRUGHIGH

## 2018-11-26 ENCOUNTER — OFFICE VISIT (OUTPATIENT)
Dept: FAMILY MEDICINE CLINIC | Age: 63
End: 2018-11-26
Payer: COMMERCIAL

## 2018-11-26 VITALS
RESPIRATION RATE: 14 BRPM | SYSTOLIC BLOOD PRESSURE: 124 MMHG | DIASTOLIC BLOOD PRESSURE: 74 MMHG | WEIGHT: 204.8 LBS | TEMPERATURE: 98 F | BODY MASS INDEX: 37.69 KG/M2 | HEART RATE: 71 BPM | OXYGEN SATURATION: 100 % | HEIGHT: 62 IN

## 2018-11-26 DIAGNOSIS — Z23 NEED FOR IMMUNIZATION AGAINST INFLUENZA: ICD-10-CM

## 2018-11-26 DIAGNOSIS — Z12.31 SCREENING MAMMOGRAM, ENCOUNTER FOR: ICD-10-CM

## 2018-11-26 DIAGNOSIS — R10.9 LEFT FLANK PAIN: ICD-10-CM

## 2018-11-26 DIAGNOSIS — I10 ESSENTIAL HYPERTENSION: ICD-10-CM

## 2018-11-26 DIAGNOSIS — R20.2 ARM PARESTHESIA, RIGHT: ICD-10-CM

## 2018-11-26 DIAGNOSIS — E78.2 MIXED HYPERLIPIDEMIA: Primary | ICD-10-CM

## 2018-11-26 PROCEDURE — 90471 IMMUNIZATION ADMIN: CPT | Performed by: FAMILY MEDICINE

## 2018-11-26 PROCEDURE — 99214 OFFICE O/P EST MOD 30 MIN: CPT | Performed by: FAMILY MEDICINE

## 2018-11-26 PROCEDURE — 90688 IIV4 VACCINE SPLT 0.5 ML IM: CPT | Performed by: FAMILY MEDICINE

## 2018-11-26 ASSESSMENT — PATIENT HEALTH QUESTIONNAIRE - PHQ9
SUM OF ALL RESPONSES TO PHQ QUESTIONS 1-9: 0
SUM OF ALL RESPONSES TO PHQ9 QUESTIONS 1 & 2: 0
2. FEELING DOWN, DEPRESSED OR HOPELESS: 0
SUM OF ALL RESPONSES TO PHQ QUESTIONS 1-9: 0
1. LITTLE INTEREST OR PLEASURE IN DOING THINGS: 0

## 2019-01-03 DIAGNOSIS — I10 ESSENTIAL HYPERTENSION: ICD-10-CM

## 2019-01-04 RX ORDER — LOSARTAN POTASSIUM 100 MG/1
TABLET ORAL
Qty: 90 TABLET | Refills: 1 | Status: SHIPPED | OUTPATIENT
Start: 2019-01-04 | End: 2021-08-24 | Stop reason: SDUPTHER

## 2019-02-20 ENCOUNTER — TELEPHONE (OUTPATIENT)
Dept: FAMILY MEDICINE CLINIC | Age: 64
End: 2019-02-20

## 2019-02-20 DIAGNOSIS — E55.9 VITAMIN D DEFICIENCY: Primary | ICD-10-CM

## 2019-02-21 DIAGNOSIS — E55.9 VITAMIN D DEFICIENCY: Primary | ICD-10-CM

## 2019-02-21 DIAGNOSIS — I10 ESSENTIAL HYPERTENSION: ICD-10-CM

## 2019-02-21 DIAGNOSIS — E78.2 MIXED HYPERLIPIDEMIA: ICD-10-CM

## 2019-02-21 DIAGNOSIS — E55.9 VITAMIN D DEFICIENCY: ICD-10-CM

## 2019-02-21 DIAGNOSIS — R20.2 ARM PARESTHESIA, RIGHT: ICD-10-CM

## 2019-02-21 LAB
ALBUMIN SERPL-MCNC: 4.1 G/DL (ref 3.5–4.6)
ALP BLD-CCNC: 54 U/L (ref 40–130)
ALT SERPL-CCNC: 23 U/L (ref 0–33)
ANION GAP SERPL CALCULATED.3IONS-SCNC: 16 MEQ/L (ref 9–15)
AST SERPL-CCNC: 26 U/L (ref 0–35)
BASOPHILS ABSOLUTE: 0 K/UL (ref 0–0.2)
BASOPHILS RELATIVE PERCENT: 0.6 %
BILIRUB SERPL-MCNC: 0.3 MG/DL (ref 0.2–0.7)
BUN BLDV-MCNC: 14 MG/DL (ref 8–23)
CALCIUM SERPL-MCNC: 10.6 MG/DL (ref 8.5–9.9)
CHLORIDE BLD-SCNC: 96 MEQ/L (ref 95–107)
CHOLESTEROL, TOTAL: 231 MG/DL (ref 0–199)
CO2: 28 MEQ/L (ref 20–31)
CREAT SERPL-MCNC: 0.5 MG/DL (ref 0.5–0.9)
EOSINOPHILS ABSOLUTE: 0.3 K/UL (ref 0–0.7)
EOSINOPHILS RELATIVE PERCENT: 3 %
GFR AFRICAN AMERICAN: >60
GFR NON-AFRICAN AMERICAN: >60
GLOBULIN: 3.2 G/DL (ref 2.3–3.5)
GLUCOSE BLD-MCNC: 101 MG/DL (ref 70–99)
HCT VFR BLD CALC: 41 % (ref 37–47)
HDLC SERPL-MCNC: 53 MG/DL (ref 40–59)
HEMOGLOBIN: 13.8 G/DL (ref 12–16)
LDL CHOLESTEROL CALCULATED: 121 MG/DL (ref 0–129)
LYMPHOCYTES ABSOLUTE: 3.5 K/UL (ref 1–4.8)
LYMPHOCYTES RELATIVE PERCENT: 41 %
MCH RBC QN AUTO: 30.2 PG (ref 27–31.3)
MCHC RBC AUTO-ENTMCNC: 33.7 % (ref 33–37)
MCV RBC AUTO: 89.6 FL (ref 82–100)
MONOCYTES ABSOLUTE: 0.7 K/UL (ref 0.2–0.8)
MONOCYTES RELATIVE PERCENT: 7.8 %
NEUTROPHILS ABSOLUTE: 4 K/UL (ref 1.4–6.5)
NEUTROPHILS RELATIVE PERCENT: 47.6 %
PDW BLD-RTO: 14.4 % (ref 11.5–14.5)
PLATELET # BLD: 266 K/UL (ref 130–400)
POTASSIUM SERPL-SCNC: 4.4 MEQ/L (ref 3.4–4.9)
RBC # BLD: 4.57 M/UL (ref 4.2–5.4)
SODIUM BLD-SCNC: 140 MEQ/L (ref 135–144)
TOTAL PROTEIN: 7.3 G/DL (ref 6.3–8)
TRIGL SERPL-MCNC: 287 MG/DL (ref 0–150)
TSH SERPL DL<=0.05 MIU/L-ACNC: 3.17 UIU/ML (ref 0.44–3.86)
VITAMIN B-12: 1019 PG/ML (ref 232–1245)
VITAMIN D 25-HYDROXY: 40.7 NG/ML (ref 30–100)
WBC # BLD: 8.5 K/UL (ref 4.8–10.8)

## 2019-02-28 ENCOUNTER — OFFICE VISIT (OUTPATIENT)
Dept: FAMILY MEDICINE CLINIC | Age: 64
End: 2019-02-28
Payer: MEDICARE

## 2019-02-28 VITALS
TEMPERATURE: 99.2 F | HEIGHT: 62 IN | SYSTOLIC BLOOD PRESSURE: 104 MMHG | OXYGEN SATURATION: 97 % | RESPIRATION RATE: 14 BRPM | DIASTOLIC BLOOD PRESSURE: 76 MMHG | WEIGHT: 201.8 LBS | HEART RATE: 74 BPM | BODY MASS INDEX: 37.13 KG/M2

## 2019-02-28 DIAGNOSIS — Z00.00 ROUTINE GENERAL MEDICAL EXAMINATION AT A HEALTH CARE FACILITY: Primary | ICD-10-CM

## 2019-02-28 DIAGNOSIS — Z23 NEED FOR PROPHYLACTIC VACCINATION AGAINST STREPTOCOCCUS PNEUMONIAE (PNEUMOCOCCUS): ICD-10-CM

## 2019-02-28 PROCEDURE — 90670 PCV13 VACCINE IM: CPT | Performed by: FAMILY MEDICINE

## 2019-02-28 PROCEDURE — G0009 ADMIN PNEUMOCOCCAL VACCINE: HCPCS | Performed by: FAMILY MEDICINE

## 2019-02-28 PROCEDURE — G0438 PPPS, INITIAL VISIT: HCPCS | Performed by: FAMILY MEDICINE

## 2019-02-28 ASSESSMENT — VISUAL ACUITY
OS_CC: 20/30
OD_CC: 20/30

## 2019-02-28 ASSESSMENT — PATIENT HEALTH QUESTIONNAIRE - PHQ9
SUM OF ALL RESPONSES TO PHQ QUESTIONS 1-9: 0
SUM OF ALL RESPONSES TO PHQ QUESTIONS 1-9: 0

## 2019-02-28 ASSESSMENT — LIFESTYLE VARIABLES: HOW OFTEN DO YOU HAVE A DRINK CONTAINING ALCOHOL: 0

## 2019-02-28 ASSESSMENT — ANXIETY QUESTIONNAIRES: GAD7 TOTAL SCORE: 0

## 2019-03-07 ENCOUNTER — OFFICE VISIT (OUTPATIENT)
Dept: CARDIOLOGY CLINIC | Age: 64
End: 2019-03-07
Payer: MEDICARE

## 2019-03-07 VITALS
WEIGHT: 202 LBS | BODY MASS INDEX: 37.17 KG/M2 | DIASTOLIC BLOOD PRESSURE: 80 MMHG | SYSTOLIC BLOOD PRESSURE: 130 MMHG | HEIGHT: 62 IN | HEART RATE: 75 BPM

## 2019-03-07 DIAGNOSIS — I10 ESSENTIAL HYPERTENSION: Primary | ICD-10-CM

## 2019-03-07 DIAGNOSIS — I25.10 CHRONIC CORONARY ARTERY DISEASE: ICD-10-CM

## 2019-03-07 PROCEDURE — 99214 OFFICE O/P EST MOD 30 MIN: CPT | Performed by: INTERNAL MEDICINE

## 2019-03-07 PROCEDURE — 93000 ELECTROCARDIOGRAM COMPLETE: CPT | Performed by: INTERNAL MEDICINE

## 2019-03-07 RX ORDER — NITROGLYCERIN 0.4 MG/1
0.4 TABLET SUBLINGUAL EVERY 5 MIN PRN
Qty: 25 TABLET | Refills: 3 | Status: SHIPPED | OUTPATIENT
Start: 2019-03-07 | End: 2020-06-23

## 2019-03-07 RX ORDER — EZETIMIBE 10 MG/1
10 TABLET ORAL DAILY
Qty: 30 TABLET | Refills: 5 | Status: SHIPPED | OUTPATIENT
Start: 2019-03-07 | End: 2019-09-19 | Stop reason: SINTOL

## 2019-03-07 RX ORDER — CARVEDILOL 25 MG/1
TABLET ORAL
COMMUNITY
End: 2019-09-19 | Stop reason: SDUPTHER

## 2019-03-07 RX ORDER — MULTIVIT-MIN/IRON/FOLIC ACID/K 18-600-40
CAPSULE ORAL
COMMUNITY

## 2019-03-13 ENCOUNTER — TELEPHONE (OUTPATIENT)
Dept: FAMILY MEDICINE CLINIC | Age: 64
End: 2019-03-13

## 2019-03-13 DIAGNOSIS — R20.2 ARM PARESTHESIA, RIGHT: Primary | ICD-10-CM

## 2019-03-21 ENCOUNTER — OFFICE VISIT (OUTPATIENT)
Dept: PHYSICAL MEDICINE AND REHAB | Age: 64
End: 2019-03-21
Payer: MEDICARE

## 2019-03-21 DIAGNOSIS — R20.2 ARM PARESTHESIA, RIGHT: ICD-10-CM

## 2019-03-21 DIAGNOSIS — G56.03 BILATERAL CARPAL TUNNEL SYNDROME: Primary | ICD-10-CM

## 2019-03-21 PROCEDURE — 95910 NRV CNDJ TEST 7-8 STUDIES: CPT | Performed by: PHYSICAL MEDICINE & REHABILITATION

## 2019-03-21 PROCEDURE — 95886 MUSC TEST DONE W/N TEST COMP: CPT | Performed by: PHYSICAL MEDICINE & REHABILITATION

## 2019-03-25 DIAGNOSIS — G56.00 CARPAL TUNNEL SYNDROME, UNSPECIFIED LATERALITY: Primary | ICD-10-CM

## 2019-06-07 ENCOUNTER — TELEPHONE (OUTPATIENT)
Dept: CARDIOLOGY CLINIC | Age: 64
End: 2019-06-07

## 2019-09-04 RX ORDER — CARVEDILOL 25 MG/1
TABLET ORAL
Qty: 180 TABLET | Refills: 1 | Status: SHIPPED | OUTPATIENT
Start: 2019-09-04 | End: 2020-06-01

## 2019-09-19 ENCOUNTER — OFFICE VISIT (OUTPATIENT)
Dept: CARDIOLOGY CLINIC | Age: 64
End: 2019-09-19
Payer: MEDICARE

## 2019-09-19 VITALS
HEIGHT: 62 IN | BODY MASS INDEX: 37.47 KG/M2 | OXYGEN SATURATION: 97 % | SYSTOLIC BLOOD PRESSURE: 140 MMHG | DIASTOLIC BLOOD PRESSURE: 80 MMHG | HEART RATE: 82 BPM | WEIGHT: 203.6 LBS

## 2019-09-19 DIAGNOSIS — E78.2 MIXED HYPERLIPIDEMIA: Primary | ICD-10-CM

## 2019-09-19 DIAGNOSIS — I25.10 CHRONIC CORONARY ARTERY DISEASE: ICD-10-CM

## 2019-09-19 DIAGNOSIS — I10 ESSENTIAL HYPERTENSION: ICD-10-CM

## 2019-09-19 DIAGNOSIS — Z78.9 STATIN INTOLERANCE: ICD-10-CM

## 2019-09-19 PROCEDURE — 99213 OFFICE O/P EST LOW 20 MIN: CPT | Performed by: INTERNAL MEDICINE

## 2019-09-19 RX ORDER — LANOLIN ALCOHOL/MO/W.PET/CERES
3 CREAM (GRAM) TOPICAL DAILY
COMMUNITY
End: 2022-08-30

## 2019-09-19 NOTE — PROGRESS NOTES
Chief Complaint   Patient presents with    Hypertension    Hyperlipidemia       Patient presents for initial medical evaluation. Patient is followed on a regular basis by Dr. Jea Rudd MD. Hx of CAD s/p PCI in 2002 at [facility]. Has not seen a cardiologist for 5 yrs. NO recent stress test. States she had lower anterior rib pain the other day, does have some atypical CP here and there per patient. No smoking. She does have SOB with moderate exertion. Does have LE edema as well. compliant with meds. Pt denies  dyspnea,  change in exercise capacity, fatigue,  nausea, vomiting, diarrhea, constipation, motor weakness, insomnia, weight loss, syncope, dizziness, lightheadedness, palpitations, PND, orthopnea. States she has b/l leg pain all over. States she has bad knees. Does have a hx of statin intolerance. Has tried lipitor, zocor and Pracachol per patient. Does have a hx of DVT and factor V leiden and is on [facility] [employer]. 5-30-17:states her symptoms have resolved. Does have SOB real bad she states however. Pt denies chest pain, , change in exercise capacity, fatigue,  nausea, vomiting, diarrhea, constipation, motor weakness, insomnia, weight loss, syncope, dizziness, lightheadedness, palpitations, PND, orthopnea, or claudication. No nitro use. BP and hr are good. CAD is stable. No LE discoloration or ulcers. No LE edema. No CHF type symptoms. Lipid profile is normal. Tolerating livalo ok so far. States she is tired and would like to get off inderal.     S/p DSE:     RESULTS: Resting EKG revealed heart rate of 81 beats per minute. No  diagnostic ST-T wave changes were seen. Frequent PVCs were noted. At peak  infusion of IV dobutamine. Blood pressure response was hypertensive. Maximum  systolic blood pressure 494 mmHg, heart rate response was blunted. Maximum  heart rate was 107 beats per minute. This may have been due to beta blocker  therapy.      RESULTS: Resting cardiogram showed normal valvular structures. Left  ventricular ejection fraction normal at 60%. Pericardium is normal.   Periaorta is normal. At peak infusion, IV dobutamine, there was normal  augmentation contractility of left ventricle without evidence of infarcted or  ischemic myocardium. There was incremental left ventricular ejection fraction  from 60% to 80% at peak infusion with IV dobutamine.     IMPRESSION:   1. Blunted heart response secondary to beta blocker therapy. 2. No definite evidence of infarcted or ischemic myocardium. 3. Augmentation of left ventricular fraction from 60% to 80% with exercise. 4. Frequent PVCs were noted. 5. Hypertensive response to IV Dobutamine.     11-30-17: Pt denies chest pain, dyspnea, dyspnea on exertion, change in exercise capacity, fatigue,  nausea, vomiting, diarrhea, constipation, motor weakness, insomnia, weight loss, syncope, dizziness, lightheadedness, palpitations, PND, orthopnea, or claudication. No nitro use. BP and hr are good. CAD is stable. No LE discoloration or ulcers. No LE edema. No CHF type symptoms. Lipid profile is normal. She did not tolerate Livalo as well, taking tumeric. No smoking. Felt better after taken off of inderal and switched to coreg. No nitro use. BP and hr are good. CAD is stable. No LE discoloration or ulcers. No LE edema. No CHF type symptoms. Lipid profile is abnormal. Non complaint with CPAP. 5-31-18: feels better off of inderal, now on coreg. Feeling well overall. Feels bottom of her feet feel numb. Pt denies chest pain, dyspnea, dyspnea on exertion, change in exercise capacity, fatigue,  nausea, vomiting, diarrhea, constipation, motor weakness, insomnia, weight loss, syncope, dizziness, lightheadedness, palpitations, PND, orthopnea, or claudication. No nitro use. BP and hr are good. CAD is stable. No LE discoloration or ulcers. No LE edema. No CHF type symptoms. Lipid profile is abnormal. No recent hospitalization. No change in meds.  Does have a hx of DVT and factor Disease Sister        Current Outpatient Medications   Medication Sig Dispense Refill    melatonin 3 MG TABS tablet Take 3 mg by mouth daily      carvedilol (COREG) 25 MG tablet TAKE 1 TABLET BY MOUTH TWICE DAILY WITH MEALS 180 tablet 1    Cholecalciferol (VITAMIN D) 2000 units CAPS capsule Take by mouth 2 TABS QD      nitroGLYCERIN (NITROSTAT) 0.4 MG SL tablet Place 1 tablet under the tongue every 5 minutes as needed for Chest pain 25 tablet 3    losartan (COZAAR) 100 MG tablet TAKE 1 TABLET BY MOUTH ONCE DAILY 90 tablet 1    chlorthalidone (HYGROTON) 25 MG tablet TAKE ONE TABLET BY MOUTH ONCE DAILY 90 tablet 3    Multiple Vitamin (MULTI VITAMIN PO) Take by mouth      TURMERIC PO Take by mouth      aspirin 81 MG tablet Take 81 mg by mouth daily       No current facility-administered medications for this visit. Latex; Amlodipine; Atorvastatin; Cephalexin; Ciprofloxacin; Codeine; Livalo [pitavastatin]; Metoclopramide; Pravastatin; Simvastatin; Sulfa antibiotics; and Adhesive tape    Review of Systems:  General ROS: negative  Psychological ROS: negative  Hematological and Lymphatic ROS: No history of blood clots or bleeding disorder. Respiratory ROS: no cough, shortness of breath, or wheezing  Cardiovascular ROS: no chest pain or dyspnea on exertion  Gastrointestinal ROS: no abdominal pain, change in bowel habits, or black or bloody stools  Genito-Urinary ROS: no dysuria, trouble voiding, or hematuria  Musculoskeletal ROS: negative  Neurological ROS: no TIA or stroke symptoms  Dermatological ROS: negative    VITALS:  Blood pressure (!) 140/80, pulse 82, height 5' 1.75\" (1.568 m), weight 203 lb 9.6 oz (92.4 kg), last menstrual period 11/26/1989, SpO2 97 %. Body mass index is 37.54 kg/m². Physical Examination:  General appearance - alert, well appearing, and in no distress  Mental status - alert, oriented to person, place, and time  Neck - Neck is supple, no JVD or carotid bruits.   No thyromegaly

## 2020-06-01 RX ORDER — CARVEDILOL 25 MG/1
TABLET ORAL
Qty: 60 TABLET | Refills: 2 | Status: SHIPPED | OUTPATIENT
Start: 2020-06-01 | End: 2020-10-02

## 2020-06-23 RX ORDER — NITROGLYCERIN 0.4 MG/1
TABLET SUBLINGUAL
Qty: 25 TABLET | Refills: 0 | Status: SHIPPED | OUTPATIENT
Start: 2020-06-23 | End: 2022-05-19 | Stop reason: SDUPTHER

## 2020-10-02 RX ORDER — CARVEDILOL 25 MG/1
TABLET ORAL
Qty: 60 TABLET | Refills: 1 | Status: SHIPPED | OUTPATIENT
Start: 2020-10-02 | End: 2020-12-08

## 2020-10-02 NOTE — TELEPHONE ENCOUNTER
requesting medication refill. Please approve or deny this request.    Rx requested:  Requested Prescriptions     Pending Prescriptions Disp Refills    carvedilol (COREG) 25 MG tablet [Pharmacy Med Name: Carvedilol 25 MG Oral Tablet] 60 tablet 0     Sig: TAKE 1 TABLET BY MOUTH TWICE DAILY WITH MEALS         Last Office Visit:   9/19/2019      Next Visit Date:  No future appointments.

## 2020-10-27 ENCOUNTER — OFFICE VISIT (OUTPATIENT)
Dept: CARDIOLOGY CLINIC | Age: 65
End: 2020-10-27
Payer: MEDICARE

## 2020-10-27 VITALS
DIASTOLIC BLOOD PRESSURE: 70 MMHG | SYSTOLIC BLOOD PRESSURE: 110 MMHG | TEMPERATURE: 97 F | HEART RATE: 84 BPM | BODY MASS INDEX: 37.98 KG/M2 | WEIGHT: 206 LBS

## 2020-10-27 PROBLEM — I20.8 ANGINA AT REST (HCC): Status: ACTIVE | Noted: 2020-10-27

## 2020-10-27 PROBLEM — I20.89 ANGINA AT REST: Status: ACTIVE | Noted: 2020-10-27

## 2020-10-27 PROCEDURE — 99214 OFFICE O/P EST MOD 30 MIN: CPT | Performed by: INTERNAL MEDICINE

## 2020-10-27 PROCEDURE — 93000 ELECTROCARDIOGRAM COMPLETE: CPT | Performed by: INTERNAL MEDICINE

## 2020-10-27 PROCEDURE — 90694 VACC AIIV4 NO PRSRV 0.5ML IM: CPT | Performed by: INTERNAL MEDICINE

## 2020-10-27 PROCEDURE — G0008 ADMIN INFLUENZA VIRUS VAC: HCPCS | Performed by: INTERNAL MEDICINE

## 2020-10-27 NOTE — PROGRESS NOTES
Chief Complaint   Patient presents with    Hypertension    Hyperlipidemia    Coronary Artery Disease       Patient presents for initial medical evaluation. Patient is followed on a regular basis by Dr. Kareem Carbajal MD. Hx of CAD s/p PCI in 2002 at [facility]. Has not seen a cardiologist for 5 yrs. NO recent stress test. States she had lower anterior rib pain the other day, does have some atypical CP here and there per patient. No smoking. She does have SOB with moderate exertion. Does have LE edema as well. compliant with meds. Pt denies  dyspnea,  change in exercise capacity, fatigue,  nausea, vomiting, diarrhea, constipation, motor weakness, insomnia, weight loss, syncope, dizziness, lightheadedness, palpitations, PND, orthopnea. States she has b/l leg pain all over. States she has bad knees. Does have a hx of statin intolerance. Has tried lipitor, zocor and Pracachol per patient. Does have a hx of DVT and factor V leiden and [facility] 5-[facility]:states her symptoms have resolved. Does have SOB real bad she states however. Pt denies chest pain, , change in exercise capacity, fatigue,  nausea, vomiting, diarrhea, constipation, motor weakness, insomnia, weight loss, syncope, dizziness, lightheadedness, palpitations, PND, orthopnea, or claudication. No nitro use. BP and hr are good. CAD is stable. No LE discoloration or ulcers. No LE edema. No CHF type symptoms. Lipid profile is normal. Tolerating livalo ok so far. States she is tired and would like to get off inderal.     S/p DSE:     RESULTS: Resting EKG revealed heart rate of 81 beats per minute. No  diagnostic ST-T wave changes were seen. Frequent PVCs were noted. At peak  infusion of IV dobutamine. Blood pressure response was hypertensive. Maximum  systolic blood pressure 768 mmHg, heart rate response was blunted. Maximum  heart rate was 107 beats per minute.  This may have been due to beta blocker  therapy.      RESULTS: Resting cardiogram showed normal valvular structures. Left  ventricular ejection fraction normal at 60%. Pericardium is normal.   Periaorta is normal. At peak infusion, IV dobutamine, there was normal  augmentation contractility of left ventricle without evidence of infarcted or  ischemic myocardium. There was incremental left ventricular ejection fraction  from 60% to 80% at peak infusion with IV dobutamine.     IMPRESSION:   1. Blunted heart response secondary to beta blocker therapy. 2. No definite evidence of infarcted or ischemic myocardium. 3. Augmentation of left ventricular fraction from 60% to 80% with exercise. 4. Frequent PVCs were noted. 5. Hypertensive response to IV Dobutamine.     11-30-17: Pt denies chest pain, dyspnea, dyspnea on exertion, change in exercise capacity, fatigue,  nausea, vomiting, diarrhea, constipation, motor weakness, insomnia, weight loss, syncope, dizziness, lightheadedness, palpitations, PND, orthopnea, or claudication. No nitro use. BP and hr are good. CAD is stable. No LE discoloration or ulcers. No LE edema. No CHF type symptoms. Lipid profile is normal. She did not tolerate Livalo as well, taking tumeric. No smoking. Felt better after taken off of inderal and switched to coreg. No nitro use. BP and hr are good. CAD is stable. No LE discoloration or ulcers. No LE edema. No CHF type symptoms. Lipid profile is abnormal. Non complaint with CPAP. 5-31-18: feels better off of inderal, now on coreg. Feeling well overall. Feels bottom of her feet feel numb. Pt denies chest pain, dyspnea, dyspnea on exertion, change in exercise capacity, fatigue,  nausea, vomiting, diarrhea, constipation, motor weakness, insomnia, weight loss, syncope, dizziness, lightheadedness, palpitations, PND, orthopnea, or claudication. No nitro use. BP and hr are good. CAD is stable. No LE discoloration or ulcers. No LE edema. No CHF type symptoms. Lipid profile is abnormal. No recent hospitalization. No change in meds.  Does have a hx of DVT and factor V leiden and is supposed to be on  [facility] but she is not taking it.has lost 4 pounds. Does have statin intolerance. 3-7-19: Pt denies chest pain, dyspnea, dyspnea on exertion, change in exercise capacity, fatigue,  nausea, vomiting, diarrhea, constipation, motor weakness, insomnia, weight loss, syncope, dizziness, lightheadedness, palpitations, PND, orthopnea, or claudication. No nitro use. BP and hr are good. CAD is stable. No LE discoloration or ulcers. No LE edema. No CHF type symptoms. Lipid profile is ABnormal, LDL is 121, , Trig 287. No recent hospitalization. No change in meds. NO smoking. Does have a hx of statin intolerance. Has tried lipitor, zocor and Pracachol per patient. Does have a hx of DVT and factor V leiden and is on [facility] for [facility]. 9-19-19: not on [facility]. On ASA only. hx of DVT and factor V leiden defiency. Pt denies chest pain, dyspnea, dyspnea on exertion, change in exercise capacity, fatigue,  nausea, vomiting, diarrhea, constipation, motor weakness, insomnia, weight loss, syncope, dizziness, lightheadedness, palpitations, PND, orthopnea, or claudication. No nitro use. BP and hr are good. No LE discoloration or ulcers. No LE edema. No CHF type symptoms. Lipid profile is normal. No recent hospitalization. No change in meds. Did not tolerate Zetia, had blisters on her arms. 10-27-20: Patient complains of atypical chest pain from time to time. Feels like a dull type of pain in the middle of her chest.  She states she underwent stress test in July 2020 at [facility] which was negative per patient. She states she has GERD as well and pain goes up into her shoulders bilaterally. She also has associated shortness of breath and some nausea. States chest pain occurs 3 times a week. Longest episode lasted approximately 10 minutes, she took nitro 1 time and it relieved her symptoms. Patient could not tolerate Livalo.   She has a history of DVT and factor V Leiden deficiency, he is not on oral anticoagulation due to patient refusing. EKG with normal sinus rhythm, nonspecific ST changes, incomplete right bundle branch block. Patient Active Problem List   Diagnosis    Vitamin D deficiency    Status post coronary artery balloon dilation    Obstructive sleep apnea syndrome    Obesity (BMI 30-39. 9)    Long term current use of anticoagulant therapy    Hyperlipidemia    Hypertension    Gastroesophageal reflux disease    Factor V Leiden mutation ([address]    Essential tremor    Stricture of esophagus    Deep vein thrombosis of lower extremity (HCC)    Chronic coronary artery disease    Gastroesophageal reflux disease without esophagitis    Statin intolerance    Arm paresthesia, right    Tear of lateral cartilage or meniscus of knee, current    Bilateral carpal tunnel syndrome    [facility]ina at rest [facility])       Past Surgical History:   Procedure Laterality Date    CHOLECYSTECTOMY      CORONARY ANGIOPLASTY WITH STENT PLACEMENT      CYST REMOVAL      top of mouth, benign    HYSTERECTOMY      KNEE ARTHROSCOPY Bilateral     TONSILLECTOMY         Social History     Socioeconomic History    Marital status:      Spouse name: Not on file    Number of children: Not on file    Years of education: Not on file    Highest education level: Not on file   Occupational History    Not on file   Social Needs    Financial resource strain: Not on file    Food insecurity     Worry: Not on file     Inability: Not on file    Transportation needs     Medical: Not on file     Non-medical: Not on file   Tobacco Use    Smoking status: Never Smoker    Smokeless tobacco: Never Used   Substance and Sexual Activity    Alcohol use: No    Drug use: Not on file    Sexual activity: Not on file   Lifestyle    Physical activity     Days per week: Not on file     Minutes per session: Not on file    Stress: Not on file   Relationships    Social connections     Talks on tape    Review of Systems:  General ROS: negative  Psychological ROS: negative  Hematological and Lymphatic ROS: No history of blood clots or bleeding disorder. Respiratory ROS: no cough, shortness of breath, or wheezing  Cardiovascular ROS: no chest pain or dyspnea on exertion  Gastrointestinal ROS: no abdominal pain, change in bowel habits, or black or bloody stools  Genito-Urinary ROS: no dysuria, trouble voiding, or hematuria  Musculoskeletal ROS: negative  Neurological ROS: no TIA or stroke symptoms  Dermatological ROS: negative    VITALS:  Blood pressure 110/70, pulse 84, temperature 97 °F (36.1 °C), temperature source Infrared, weight 206 lb (93.4 kg), last menstrual period 11/26/1989. Body mass index is 37.98 kg/m². Physical Examination:  General appearance - alert, well appearing, and in no distress  Mental status - alert, oriented to person, place, and time  Neck - Neck is supple, no JVD or carotid bruits. No thyromegaly or adenopathy. Chest - clear to auscultation, no wheezes, rales or rhonchi, symmetric air entry  Heart - normal rate, regular rhythm, normal S1, S2, no murmurs, rubs, clicks or gallops  Abdomen - soft, nontender, nondistended, no masses or organomegaly  Neurological - alert, oriented, normal speech, no focal findings or movement disorder noted  Extremities - peripheral pulses normal, no pedal edema, no clubbing or cyanosis  Skin - normal coloration and turgor, no rashes, no suspicious skin lesions noted      EKG: normal sinus rhythm, nonspecific ST and T waves changes    Orders Placed This Encounter   Procedures    INFLUENZA, QUADV, ADJUVANTED, 65 YRS =, IM, PF, PREFILL SYR, 0.5ML (FLUAD)    EKG 12 Lead       ASSESSMENT:     Diagnosis Orders   1. Essential hypertension  EKG 12 Lead   2. Chronic coronary artery disease  EKG 12 Lead   3. Statin intolerance     4. Mixed hyperlipidemia     5. Obesity (BMI 30-39.9)     6.  Angina at rest [facility])         PLAN:     Patient will need to

## 2020-12-08 RX ORDER — CARVEDILOL 25 MG/1
TABLET ORAL
Qty: 60 TABLET | Refills: 6 | Status: SHIPPED | OUTPATIENT
Start: 2020-12-08 | End: 2021-08-24 | Stop reason: SDUPTHER

## 2020-12-08 NOTE — TELEPHONE ENCOUNTER
requesting medication refill.  Please approve or deny this request.    Rx requested:  Requested Prescriptions     Pending Prescriptions Disp Refills    carvedilol (COREG) 25 MG tablet [Pharmacy Med Name: Carvedilol 25 MG Oral Tablet] 60 tablet 0     Sig: TAKE 1 TABLET BY MOUTH TWICE DAILY WITH MEALS         Last Office Visit:   10/27/2020      Next Visit Date:  Future Appointments   Date Time Provider Rocky Hinson   1/5/2021  9:30 AM Yandel Kennedy, DO [facility]

## 2021-01-05 ENCOUNTER — OFFICE VISIT (OUTPATIENT)
Dept: CARDIOLOGY CLINIC | Age: 66
End: 2021-01-05
Payer: COMMERCIAL

## 2021-01-05 VITALS
DIASTOLIC BLOOD PRESSURE: 80 MMHG | WEIGHT: 203 LBS | SYSTOLIC BLOOD PRESSURE: 120 MMHG | OXYGEN SATURATION: 98 % | TEMPERATURE: 97.9 F | BODY MASS INDEX: 37.43 KG/M2 | HEART RATE: 81 BPM

## 2021-01-05 DIAGNOSIS — I10 ESSENTIAL HYPERTENSION: ICD-10-CM

## 2021-01-05 DIAGNOSIS — I20.8 ANGINA AT REST (HCC): ICD-10-CM

## 2021-01-05 DIAGNOSIS — M79.604 BILATERAL LEG PAIN: ICD-10-CM

## 2021-01-05 DIAGNOSIS — M79.605 BILATERAL LEG PAIN: ICD-10-CM

## 2021-01-05 DIAGNOSIS — I25.10 CHRONIC CORONARY ARTERY DISEASE: Primary | ICD-10-CM

## 2021-01-05 DIAGNOSIS — E78.2 MIXED HYPERLIPIDEMIA: ICD-10-CM

## 2021-01-05 PROCEDURE — 99214 OFFICE O/P EST MOD 30 MIN: CPT | Performed by: INTERNAL MEDICINE

## 2021-01-05 RX ORDER — FAMOTIDINE 40 MG/1
40 TABLET, FILM COATED ORAL DAILY
COMMUNITY
Start: 2020-10-27 | End: 2022-08-30

## 2021-01-05 NOTE — PROGRESS NOTES
Chief Complaint   Patient presents with    Hypertension    Hyperlipidemia       Patient presents for initial medical evaluation. Patient is followed on a regular basis by Dr. Denis De Jesus MD. Hx of CAD s/p PCI in 2002 at [facility]. Has not seen a cardiologist for 5 yrs. NO recent stress test. States she had lower anterior rib pain the other day, does have some atypical CP here and there per patient. No smoking. She does have SOB with moderate exertion. Does have LE edema as well. compliant with meds. Pt denies  dyspnea,  change in exercise capacity, fatigue,  nausea, vomiting, diarrhea, constipation, motor weakness, insomnia, weight loss, syncope, dizziness, lightheadedness, palpitations, PND, orthopnea. States she has b/l leg pain all over. States she has bad knees. Does have a hx of statin intolerance. Has tried lipitor, zocor and Pracachol per patient. Does have a hx of DVT and factor V leiden and is on [facility] for life. 5-30-17:states her symptoms have resolved. Does have SOB real bad she states however. Pt denies chest pain, , change in exercise capacity, fatigue,  nausea, vomiting, diarrhea, constipation, motor weakness, insomnia, weight loss, syncope, dizziness, lightheadedness, palpitations, PND, orthopnea, or claudication. No nitro use. BP and hr are good. CAD is stable. No LE discoloration or ulcers. No LE edema. No CHF type symptoms. Lipid profile is normal. Tolerating livalo ok so far. States she is tired and would like to get off inderal.     S/p DSE:     RESULTS: Resting EKG revealed heart rate of 81 beats per minute. No  diagnostic ST-T wave changes were seen. Frequent PVCs were noted. At peak  infusion of IV dobutamine. Blood pressure response was hypertensive. Maximum  systolic blood pressure 365 mmHg, heart rate response was blunted. Maximum  heart rate was 107 beats per minute. This may have been due to beta blocker  therapy.      RESULTS: Resting cardiogram showed normal valvular structures. Left  ventricular ejection fraction normal at 60%. Pericardium is normal.   Periaorta is normal. At peak infusion, IV dobutamine, there was normal  augmentation contractility of left ventricle without evidence of infarcted or  ischemic myocardium. There was incremental left ventricular ejection fraction  from 60% to 80% at peak infusion with IV dobutamine.     IMPRESSION:   1. Blunted heart response secondary to beta blocker therapy. 2. No definite evidence of infarcted or ischemic myocardium. 3. Augmentation of left ventricular fraction from 60% to 80% with exercise. 4. Frequent PVCs were noted. 5. Hypertensive response to IV Dobutamine.     11-30-17: Pt denies chest pain, dyspnea, dyspnea on exertion, change in exercise capacity, fatigue,  nausea, vomiting, diarrhea, constipation, motor weakness, insomnia, weight loss, syncope, dizziness, lightheadedness, palpitations, PND, orthopnea, or claudication. No nitro use. BP and hr are good. CAD is stable. No LE discoloration or ulcers. No LE edema. No CHF type symptoms. Lipid profile is normal. She did not tolerate Livalo as well, taking tumeric. No smoking. Felt better after taken off of inderal and switched to coreg. No nitro use. BP and hr are good. CAD is stable. No LE discoloration or ulcers. No LE edema. No CHF type symptoms. Lipid profile is abnormal. Non complaint with CPAP. 5-31-18: feels better off of inderal, now on coreg. Feeling well overall. Feels bottom of her feet feel numb. Pt denies chest pain, dyspnea, dyspnea on exertion, change in exercise capacity, fatigue,  nausea, vomiting, diarrhea, constipation, motor weakness, insomnia, weight loss, syncope, dizziness, lightheadedness, palpitations, PND, orthopnea, or claudication. No nitro use. BP and hr are good. CAD is stable. No LE discoloration or ulcers. No LE edema. No CHF type symptoms. Lipid profile is abnormal. No recent hospitalization. No change in meds.  Does have a hx of DVT and factor V leiden and is supposed to be on  [employer] life but she is not taking it.has lost 4 pounds. Does have statin intolerance. 3-7-19: Pt denies chest pain, dyspnea, dyspnea on exertion, change in exercise capacity, fatigue,  nausea, vomiting, diarrhea, constipation, motor weakness, insomnia, weight loss, syncope, dizziness, lightheadedness, palpitations, PND, orthopnea, or claudication. No nitro use. BP and hr are good. CAD is stable. No LE discoloration or ulcers. No LE edema. No CHF type symptoms. Lipid profile is ABnormal, LDL is 121, , Trig 287. No recent hospitalization. No change in meds. NO smoking. Does have a hx of statin intolerance. Has tried lipitor, zocor and Pracachol per patient. Does have a hx of DVT and factor V leiden and is on Viraloid for life. 9-19-19: not on Viraloid. On ASA only. hx of DVT and factor V leiden defiency. Pt denies chest pain, dyspnea, dyspnea on exertion, change in exercise capacity, fatigue,  nausea, vomiting, diarrhea, constipation, motor weakness, insomnia, weight loss, syncope, dizziness, lightheadedness, palpitations, PND, orthopnea, or claudication. No nitro use. BP and hr are good. No LE discoloration or ulcers. No LE edema. No CHF type symptoms. Lipid profile is normal. No recent hospitalization. No change in meds. Did not tolerate Zetia, had blisters on her arms. 10-27-20: Patient complains of atypical chest pain from time to time. Feels like a dull type of pain in the middle of her chest.  She states she underwent stress test in July 2020 at [facility] which was negative per patient. She states she has GERD as well and pain goes up into her shoulders bilaterally. She also has associated shortness of breath and some nausea. States chest pain occurs 3 times a week. Longest episode lasted approximately 10 minutes, she took nitro 1 time and it relieved her symptoms. Patient could not tolerate Livalo.   She has a history of DVT and factor V Leiden deficiency, she is not on oral anticoagulation due to patient refusing. EKG with normal sinus rhythm, nonspecific ST changes, incomplete right bundle branch block. 1/5/2021: States she has horrible pain in her feet. States her feet are numb all the time. She denies history of diabetes. She has not had any evaluation with podiatry or neurology. He denies any recent vascular evaluation. Patient with history of DVT in left lower extremity and history of factor V Leiden deficiency she is not on any oral anticoagulation. She continues to have some atypical chest discomfort. She status post recent negative nuclear stress test in July 2020. Patient with history of hypertension, hyperlipidemia and obesity. Patient does have a history of statin intolerance. Not needing CPAP now. Blood pressure and heart rate are within normal limits today. EKG previously with normal sinus rhythm and incomplete right bundle branch block. Patient with history of coronary status post PCI at [facility] in 2002. Patient Active Problem List   Diagnosis    Vitamin D deficiency    Status post coronary artery balloon dilation    Obstructive sleep apnea syndrome    Obesity (BMI 30-39. 9)    Long term current use of anticoagulant therapy    Hyperlipidemia    Hypertension    Gastroesophageal reflux disease    Factor V Leiden mutation ([facility] [address]    Essential tremor    Stricture of esophagus    Deep vein thrombosis of lower extremity (HCC)    Chronic coronary artery disease    Gastroesophageal reflux disease without esophagitis    Statin intolerance    Arm paresthesia, right    Tear of lateral cartilage or meniscus of knee, current    Bilateral carpal tunnel syndrome    Angina at rest [facility])       Past Surgical History:   Procedure Laterality Date    CHOLECYSTECTOMY      CORONARY ANGIOPLASTY WITH STENT PLACEMENT      CYST REMOVAL      top of mouth, benign    HYSTERECTOMY      KNEE ARTHROSCOPY Bilateral     TONSILLECTOMY         Social History Socioeconomic History    Marital status:      Spouse name: Not on file    Number of children: Not on file    Years of education: Not on file    Highest education level: Not on file   Occupational History    Not on file   Social Needs    Financial resource strain: Not on file    Food insecurity     Worry: Not on file     Inability: Not on file    Transportation needs     Medical: Not on file     Non-medical: Not on file   Tobacco Use    Smoking status: Never Smoker    Smokeless tobacco: Never Used   Substance and Sexual Activity    Alcohol use: No    Drug use: Not on file    Sexual activity: Not on file   Lifestyle    Physical activity     Days per week: Not on file     Minutes per session: Not on file    Stress: Not on file   Relationships    Social connections     Talks on phone: Not on file     Gets together: Not on file     Attends Alevism service: Not on file     Active member of club or organization: Not on file     Attends meetings of clubs or organizations: Not on file     Relationship status: Not on file    Intimate partner violence     Fear of current or ex partner: Not on file     Emotionally abused: Not on file     Physically abused: Not on file     Forced sexual activity: Not on file   Other Topics Concern    Not on file   Social History Narrative    Not on file       Family History   Problem Relation Age of Onset    High Blood Pressure Mother     High Cholesterol Mother     Heart Disease Mother     High Blood Pressure Father     High Cholesterol Father     Heart Disease Brother     Arthritis Brother     Heart Disease Sister     Heart Disease Sister        Current Outpatient Medications   Medication Sig Dispense Refill    famotidine (PEPCID) 40 MG tablet TAKE 1 TABLET BY MOUTH ONCE DAILY      carvedilol (COREG) 25 MG tablet TAKE 1 TABLET BY MOUTH TWICE DAILY WITH MEALS 60 tablet 6    nitroGLYCERIN (NITROSTAT) 0.4 MG SL tablet DISSOLVE ONE TABLET UNDER THE TONGUE EVERY 5 MINUTES AS NEEDED FOR CHEST PAIN. DO NOT EXCEED A TOTAL OF 3 DOSES IN 15 MINUTES 25 tablet 0    melatonin 3 MG TABS tablet Take 3 mg by mouth daily      Cholecalciferol (VITAMIN D) 2000 units CAPS capsule Take by mouth 2 TABS QD      losartan (COZAAR) 100 MG tablet TAKE 1 TABLET BY MOUTH ONCE DAILY 90 tablet 1    chlorthalidone (HYGROTON) 25 MG tablet TAKE ONE TABLET BY MOUTH ONCE DAILY 90 tablet 3    Multiple Vitamin (MULTI VITAMIN PO) Take by mouth      TURMERIC PO Take by mouth      aspirin 81 MG tablet Take by mouth 2 tabs qd       No current facility-administered medications for this visit. Latex, Amlodipine, Atorvastatin, Cephalexin, Ciprofloxacin, Codeine, Livalo [pitavastatin], Metoclopramide, Pravastatin, Simvastatin, Sulfa antibiotics, and Adhesive tape    Review of Systems:  General ROS: negative  Psychological ROS: negative  Hematological and Lymphatic ROS: No history of blood clots or bleeding disorder. Respiratory ROS: no cough, shortness of breath, or wheezing  Cardiovascular ROS: no chest pain or dyspnea on exertion  Gastrointestinal ROS: no abdominal pain, change in bowel habits, or black or bloody stools  Genito-Urinary ROS: no dysuria, trouble voiding, or hematuria  Musculoskeletal ROS: negative  Neurological ROS: no TIA or stroke symptoms  Dermatological ROS: negative    VITALS:  Blood pressure 120/80, pulse 81, temperature 97.9 °F (36.6 °C), temperature source Infrared, weight 203 lb (92.1 kg), last menstrual period 11/26/1989, SpO2 98 %. Body mass index is 37.43 kg/m². Physical Examination:  General appearance - alert, well appearing, and in no distress  Mental status - alert, oriented to person, place, and time  Neck - Neck is supple, no JVD or carotid bruits. No thyromegaly or adenopathy.    Chest - clear to auscultation, no wheezes, rales or rhonchi, symmetric air entry  Heart - normal rate, regular rhythm, normal S1, S2, no murmurs, rubs, clicks or gallops  Abdomen - soft, nontender, nondistended, no masses or organomegaly  Neurological - alert, oriented, normal speech, no focal findings or movement disorder noted  Extremities - peripheral pulses normal, no pedal edema, no clubbing or cyanosis  Skin - normal coloration and turgor, no rashes, no suspicious skin lesions noted      EKG: normal sinus rhythm, nonspecific ST and T waves changes    Orders Placed This Encounter   Procedures    US DUP LOWER ART/BYPASS GRAFTS BILATERAL COMPLETE       ASSESSMENT:     Diagnosis Orders   1. Chronic coronary artery disease     2. Mixed hyperlipidemia     3. Essential hypertension     4. Angina at rest [facility])  LEFT HEART CATH   5. Bilateral leg pain  US DUP LOWER ART/BYPASS GRAFTS BILATERAL COMPLETE       PLAN:     Patient will need to continue to follow up with you for their general medical care    As always, aggressive risk factor modification is strongly recommended. We should adhere to the [address] VII guidelines for HTN management and the NCEP ATP III guidelines for LDL-C management. Cardiac diet is always recommended with low fat, cholesterol, calories and sodium. Continue medications at current doses. Would recommend cardiac catheterization given patient has continued chest discomfort, status post negative recent stress test and has history of coronary status post PCI in 2002 and multiple risk factors for CAD. Check LE arterial duplex US. Does not want Repatha. CPAP qhs    Weight loss discussed. Patient was advised and encouraged to check blood pressure at home or at a pharmacy, maintain a logbook, and also call us back if blood pressure are above the target ranges or if it is low. Patient clearly understands and agrees to the instructions. We will need to continue to monitor muscle and liver enzymes, BUN, CR, and electrolytes.     Details of medical condition explained and patient was warned about adverse consequences of uncontrolled medical conditions and possible side effects of prescribed medications.

## 2021-01-11 ENCOUNTER — HOSPITAL ENCOUNTER (OUTPATIENT)
Dept: CARDIAC CATH/INVASIVE PROCEDURES | Age: 66
Discharge: HOME | End: 2021-01-11
Attending: INTERNAL MEDICINE | Admitting: INTERNAL MEDICINE
Payer: COMMERCIAL

## 2021-01-11 VITALS
RESPIRATION RATE: 10 BRPM | HEIGHT: 61 IN | TEMPERATURE: 97 F | WEIGHT: 203 LBS | HEART RATE: 86 BPM | OXYGEN SATURATION: 99 % | DIASTOLIC BLOOD PRESSURE: 65 MMHG | BODY MASS INDEX: 38.33 KG/M2 | SYSTOLIC BLOOD PRESSURE: 126 MMHG

## 2021-01-11 DIAGNOSIS — I20.8 ANGINA AT REST (HCC): Primary | ICD-10-CM

## 2021-01-11 LAB
ANION GAP SERPL CALCULATED.3IONS-SCNC: 13 MEQ/L (ref 9–15)
BUN BLDV-MCNC: 16 MG/DL (ref 8–23)
CALCIUM SERPL-MCNC: 10.3 MG/DL (ref 8.5–9.9)
CHLORIDE BLD-SCNC: 102 MEQ/L (ref 95–107)
CO2: 29 MEQ/L (ref 20–31)
CREAT SERPL-MCNC: 0.67 MG/DL (ref 0.5–0.9)
EKG ATRIAL RATE: 84 BPM
EKG P AXIS: 57 DEGREES
EKG P-R INTERVAL: 202 MS
EKG Q-T INTERVAL: 416 MS
EKG QRS DURATION: 98 MS
EKG QTC CALCULATION (BAZETT): 491 MS
EKG R AXIS: 29 DEGREES
EKG T AXIS: 30 DEGREES
EKG VENTRICULAR RATE: 84 BPM
GFR AFRICAN AMERICAN: >60
GFR NON-AFRICAN AMERICAN: >60
GLUCOSE BLD-MCNC: 130 MG/DL (ref 70–99)
HCT VFR BLD CALC: 43.4 % (ref 37–47)
HEMOGLOBIN: 14.5 G/DL (ref 12–16)
MCH RBC QN AUTO: 29.5 PG (ref 27–31.3)
MCHC RBC AUTO-ENTMCNC: 33.5 % (ref 33–37)
MCV RBC AUTO: 88 FL (ref 82–100)
PDW BLD-RTO: 14.2 % (ref 11.5–14.5)
PLATELET # BLD: 284 K/UL (ref 130–400)
POTASSIUM SERPL-SCNC: 3.1 MEQ/L (ref 3.4–4.9)
RBC # BLD: 4.93 M/UL (ref 4.2–5.4)
SODIUM BLD-SCNC: 144 MEQ/L (ref 135–144)
WBC # BLD: 12.2 K/UL (ref 4.8–10.8)

## 2021-01-11 PROCEDURE — C9600 PERC DRUG-EL COR STENT SING: HCPCS | Performed by: INTERNAL MEDICINE

## 2021-01-11 PROCEDURE — 2500000003 HC RX 250 WO HCPCS

## 2021-01-11 PROCEDURE — 85027 COMPLETE CBC AUTOMATED: CPT

## 2021-01-11 PROCEDURE — C1894 INTRO/SHEATH, NON-LASER: HCPCS

## 2021-01-11 PROCEDURE — C1769 GUIDE WIRE: HCPCS

## 2021-01-11 PROCEDURE — C1887 CATHETER, GUIDING: HCPCS

## 2021-01-11 PROCEDURE — 85347 COAGULATION TIME ACTIVATED: CPT

## 2021-01-11 PROCEDURE — 6370000000 HC RX 637 (ALT 250 FOR IP)

## 2021-01-11 PROCEDURE — 93458 L HRT ARTERY/VENTRICLE ANGIO: CPT | Performed by: INTERNAL MEDICINE

## 2021-01-11 PROCEDURE — 80048 BASIC METABOLIC PNL TOTAL CA: CPT

## 2021-01-11 PROCEDURE — 93005 ELECTROCARDIOGRAM TRACING: CPT | Performed by: INTERNAL MEDICINE

## 2021-01-11 PROCEDURE — C9601 PERC DRUG-EL COR STENT BRAN: HCPCS

## 2021-01-11 PROCEDURE — 2709999900 HC NON-CHARGEABLE SUPPLY

## 2021-01-11 PROCEDURE — C1725 CATH, TRANSLUMIN NON-LASER: HCPCS

## 2021-01-11 PROCEDURE — 6360000002 HC RX W HCPCS

## 2021-01-11 PROCEDURE — C1874 STENT, COATED/COV W/DEL SYS: HCPCS

## 2021-01-11 PROCEDURE — 2580000003 HC RX 258

## 2021-01-11 PROCEDURE — 6360000004 HC RX CONTRAST MEDICATION: Performed by: INTERNAL MEDICINE

## 2021-01-11 PROCEDURE — 6370000000 HC RX 637 (ALT 250 FOR IP): Performed by: INTERNAL MEDICINE

## 2021-01-11 PROCEDURE — 92928 PRQ TCAT PLMT NTRAC ST 1 LES: CPT | Performed by: INTERNAL MEDICINE

## 2021-01-11 RX ORDER — SODIUM CHLORIDE 9 MG/ML
INJECTION, SOLUTION INTRAVENOUS CONTINUOUS
Status: DISCONTINUED | OUTPATIENT
Start: 2021-01-11 | End: 2021-01-11 | Stop reason: HOSPADM

## 2021-01-11 RX ORDER — ONDANSETRON 2 MG/ML
4 INJECTION INTRAMUSCULAR; INTRAVENOUS EVERY 6 HOURS PRN
Status: DISCONTINUED | OUTPATIENT
Start: 2021-01-11 | End: 2021-01-11 | Stop reason: HOSPADM

## 2021-01-11 RX ORDER — HYDRALAZINE HYDROCHLORIDE 20 MG/ML
10 INJECTION INTRAMUSCULAR; INTRAVENOUS EVERY 10 MIN PRN
Status: DISCONTINUED | OUTPATIENT
Start: 2021-01-11 | End: 2021-01-11 | Stop reason: HOSPADM

## 2021-01-11 RX ORDER — ONDANSETRON 2 MG/ML
INJECTION INTRAMUSCULAR; INTRAVENOUS
Status: COMPLETED
Start: 2021-01-11 | End: 2021-01-11

## 2021-01-11 RX ORDER — LABETALOL HYDROCHLORIDE 5 MG/ML
10 INJECTION, SOLUTION INTRAVENOUS EVERY 30 MIN PRN
Status: DISCONTINUED | OUTPATIENT
Start: 2021-01-11 | End: 2021-01-11 | Stop reason: HOSPADM

## 2021-01-11 RX ORDER — ASPIRIN 81 MG/1
81 TABLET, CHEWABLE ORAL ONCE
Status: DISCONTINUED | OUTPATIENT
Start: 2021-01-11 | End: 2021-01-11 | Stop reason: HOSPADM

## 2021-01-11 RX ORDER — SODIUM CHLORIDE 0.9 % (FLUSH) 0.9 %
10 SYRINGE (ML) INJECTION EVERY 12 HOURS SCHEDULED
Status: DISCONTINUED | OUTPATIENT
Start: 2021-01-11 | End: 2021-01-11 | Stop reason: HOSPADM

## 2021-01-11 RX ORDER — POTASSIUM CHLORIDE 20 MEQ/1
40 TABLET, EXTENDED RELEASE ORAL ONCE
Status: COMPLETED | OUTPATIENT
Start: 2021-01-11 | End: 2021-01-11

## 2021-01-11 RX ORDER — SODIUM CHLORIDE 0.9 % (FLUSH) 0.9 %
10 SYRINGE (ML) INJECTION PRN
Status: DISCONTINUED | OUTPATIENT
Start: 2021-01-11 | End: 2021-01-11 | Stop reason: HOSPADM

## 2021-01-11 RX ORDER — ACETAMINOPHEN 325 MG/1
650 TABLET ORAL EVERY 4 HOURS PRN
Status: DISCONTINUED | OUTPATIENT
Start: 2021-01-11 | End: 2021-01-11 | Stop reason: HOSPADM

## 2021-01-11 RX ORDER — IODIXANOL 320 MG/ML
100 INJECTION, SOLUTION INTRAVASCULAR ONCE
Status: DISCONTINUED | OUTPATIENT
Start: 2021-01-11 | End: 2021-01-11

## 2021-01-11 RX ORDER — FENTANYL CITRATE 50 UG/ML
25 INJECTION, SOLUTION INTRAMUSCULAR; INTRAVENOUS
Status: DISCONTINUED | OUTPATIENT
Start: 2021-01-11 | End: 2021-01-11 | Stop reason: HOSPADM

## 2021-01-11 RX ADMIN — ONDANSETRON 4 MG: 2 INJECTION INTRAMUSCULAR; INTRAVENOUS at 11:44

## 2021-01-11 RX ADMIN — POTASSIUM CHLORIDE 40 MEQ: 20 TABLET, EXTENDED RELEASE ORAL at 09:35

## 2021-01-11 RX ADMIN — IOPAMIDOL 145 ML: 612 INJECTION, SOLUTION INTRAVENOUS at 10:36

## 2021-01-11 NOTE — PROGRESS NOTES
Discharge instructions reviewed with patient. Questions answered. IV removed. Right wrist in stable condition. Patient discharged to main entrance.

## 2021-01-11 NOTE — PROGRESS NOTES
Received patient post procedure. Nauseated and vomiting. Right wrist stable with vasc band in place. Reports some chest pain that Dr. Dmitriy Cedillo is aware of. Recovery ongoing. Will continue to monitor.

## 2021-01-11 NOTE — BRIEF OP NOTE
Section of Cardiology  Adult Brief Cardiac Cath Procedure Note        Procedure(s):  LHC, b/l coronary angio, PCI of LAD and CX with PATRICK    Pre-operative Diagnosis:  angina    H&P Status: Completed and reviewed. Post-operative Diagnosis:      LAD: diffuse disease with 90% distal.   CX 99% mid  RCA 90% distal.     Findings:  See full report    Complications:  none    Primary Proceduralist:   Dr.Wes Connor DO    Plan    DAPT  Staged PCI of RCA in near future.        Full procedure note to follow

## 2021-01-11 NOTE — PROGRESS NOTES
Cardiac rehab note: RD visited patient in cath lab to discuss order for Cardiac Rehab program. Pt was sleepy during visit, but was able to state that she is interested in attending program pending insurance coverage.     Susan Butt RDN LD

## 2021-01-11 NOTE — PROGRESS NOTES
Radial band removed. Dressing applied. No bleeding or hematoma. Patient much more comfortable, pain free, and no nausea. Instructions provided.

## 2021-01-12 LAB
PERFORMED ON: ABNORMAL
POC ACTIVATED CLOTTING TIME KAOLIN: 230 SEC (ref 82–152)
POC SAMPLE TYPE: ABNORMAL

## 2021-01-12 PROCEDURE — 93010 ELECTROCARDIOGRAM REPORT: CPT | Performed by: INTERNAL MEDICINE

## 2021-01-13 ENCOUNTER — TELEPHONE (OUTPATIENT)
Dept: CARDIOLOGY CLINIC | Age: 66
End: 2021-01-13

## 2021-01-13 RX ORDER — CLOPIDOGREL BISULFATE 75 MG/1
75 TABLET ORAL DAILY
Qty: 30 TABLET | Refills: 5 | Status: ON HOLD | OUTPATIENT
Start: 2021-01-13 | End: 2021-01-19 | Stop reason: HOSPADM

## 2021-01-13 RX ORDER — CLOPIDOGREL BISULFATE 75 MG/1
75 TABLET ORAL DAILY
COMMUNITY
End: 2021-01-13 | Stop reason: SDUPTHER

## 2021-01-13 NOTE — TELEPHONE ENCOUNTER
SPOKE WITH PATIENT ADVISED TO CONTACT INSURANCE AND SEE WHICH IS PREFERRED EFFIENT OR [employer] AND CALL OFFICE BACK WITH WHICH ONE IS PREFERRED

## 2021-01-13 NOTE — TELEPHONE ENCOUNTER
[facility] Is there and alternative or patient assistance available?

## 2021-01-14 ENCOUNTER — TELEPHONE (OUTPATIENT)
Dept: CARDIOLOGY CLINIC | Age: 66
End: 2021-01-14

## 2021-01-14 NOTE — TELEPHONE ENCOUNTER
Patient had procedure last Monday and is scheduled for a second procedure on 1/18/21.  She would like a call back to discuss what was done in last procedure and what will be done next week

## 2021-01-15 NOTE — TELEPHONE ENCOUNTER
Had PCI of LAD and CX  Needs PCI of 90% stenosis of RCA next.      Electronically signed by DO Naldo on 1/15/2021 at 11:03 AM

## 2021-01-18 ENCOUNTER — APPOINTMENT (OUTPATIENT)
Dept: ULTRASOUND IMAGING | Age: 66
End: 2021-01-18
Payer: MEDICARE

## 2021-01-18 ENCOUNTER — HOSPITAL ENCOUNTER (OUTPATIENT)
Dept: CARDIAC CATH/INVASIVE PROCEDURES | Age: 66
Discharge: HOME | End: 2021-01-19
Attending: INTERNAL MEDICINE | Admitting: INTERNAL MEDICINE
Payer: MEDICARE

## 2021-01-18 DIAGNOSIS — I20.8 ANGINA AT REST (HCC): ICD-10-CM

## 2021-01-18 PROCEDURE — 6370000000 HC RX 637 (ALT 250 FOR IP): Performed by: INTERNAL MEDICINE

## 2021-01-18 PROCEDURE — 2709999900 HC NON-CHARGEABLE SUPPLY

## 2021-01-18 PROCEDURE — 85347 COAGULATION TIME ACTIVATED: CPT

## 2021-01-18 PROCEDURE — 6360000004 HC RX CONTRAST MEDICATION: Performed by: INTERNAL MEDICINE

## 2021-01-18 PROCEDURE — 2580000003 HC RX 258

## 2021-01-18 PROCEDURE — 6360000002 HC RX W HCPCS

## 2021-01-18 PROCEDURE — C1887 CATHETER, GUIDING: HCPCS

## 2021-01-18 PROCEDURE — 92920 PRQ TRLUML C ANGIOP 1ART&/BR: CPT | Performed by: INTERNAL MEDICINE

## 2021-01-18 PROCEDURE — C1894 INTRO/SHEATH, NON-LASER: HCPCS

## 2021-01-18 PROCEDURE — C1725 CATH, TRANSLUMIN NON-LASER: HCPCS

## 2021-01-18 PROCEDURE — 93458 L HRT ARTERY/VENTRICLE ANGIO: CPT | Performed by: INTERNAL MEDICINE

## 2021-01-18 PROCEDURE — 92928 PRQ TCAT PLMT NTRAC ST 1 LES: CPT | Performed by: INTERNAL MEDICINE

## 2021-01-18 PROCEDURE — 2580000003 HC RX 258: Performed by: INTERNAL MEDICINE

## 2021-01-18 PROCEDURE — 2500000003 HC RX 250 WO HCPCS

## 2021-01-18 PROCEDURE — 6360000002 HC RX W HCPCS: Performed by: INTERNAL MEDICINE

## 2021-01-18 PROCEDURE — C1769 GUIDE WIRE: HCPCS

## 2021-01-18 RX ORDER — ONDANSETRON 2 MG/ML
4 INJECTION INTRAMUSCULAR; INTRAVENOUS EVERY 6 HOURS PRN
Status: DISCONTINUED | OUTPATIENT
Start: 2021-01-18 | End: 2021-01-19 | Stop reason: HOSPADM

## 2021-01-18 RX ORDER — SODIUM CHLORIDE 0.9 % (FLUSH) 0.9 %
10 SYRINGE (ML) INJECTION PRN
Status: DISCONTINUED | OUTPATIENT
Start: 2021-01-18 | End: 2021-01-19 | Stop reason: HOSPADM

## 2021-01-18 RX ORDER — LOSARTAN POTASSIUM 50 MG/1
100 TABLET ORAL DAILY
Status: DISCONTINUED | OUTPATIENT
Start: 2021-01-18 | End: 2021-01-19 | Stop reason: HOSPADM

## 2021-01-18 RX ORDER — LANOLIN ALCOHOL/MO/W.PET/CERES
3 CREAM (GRAM) TOPICAL NIGHTLY
Status: DISCONTINUED | OUTPATIENT
Start: 2021-01-18 | End: 2021-01-19 | Stop reason: HOSPADM

## 2021-01-18 RX ORDER — HYDRALAZINE HYDROCHLORIDE 20 MG/ML
10 INJECTION INTRAMUSCULAR; INTRAVENOUS EVERY 10 MIN PRN
Status: DISCONTINUED | OUTPATIENT
Start: 2021-01-18 | End: 2021-01-19 | Stop reason: HOSPADM

## 2021-01-18 RX ORDER — LABETALOL HYDROCHLORIDE 5 MG/ML
10 INJECTION, SOLUTION INTRAVENOUS EVERY 30 MIN PRN
Status: DISCONTINUED | OUTPATIENT
Start: 2021-01-18 | End: 2021-01-19 | Stop reason: HOSPADM

## 2021-01-18 RX ORDER — NITROGLYCERIN 0.4 MG/1
0.4 TABLET SUBLINGUAL EVERY 5 MIN PRN
Status: DISCONTINUED | OUTPATIENT
Start: 2021-01-18 | End: 2021-01-19 | Stop reason: HOSPADM

## 2021-01-18 RX ORDER — ACETAMINOPHEN 325 MG/1
650 TABLET ORAL EVERY 4 HOURS PRN
Status: DISCONTINUED | OUTPATIENT
Start: 2021-01-18 | End: 2021-01-19 | Stop reason: HOSPADM

## 2021-01-18 RX ORDER — FAMOTIDINE 20 MG/1
40 TABLET, FILM COATED ORAL DAILY
Status: DISCONTINUED | OUTPATIENT
Start: 2021-01-18 | End: 2021-01-19 | Stop reason: HOSPADM

## 2021-01-18 RX ORDER — ASPIRIN 81 MG/1
81 TABLET, CHEWABLE ORAL DAILY
Status: DISCONTINUED | OUTPATIENT
Start: 2021-01-18 | End: 2021-01-19 | Stop reason: HOSPADM

## 2021-01-18 RX ORDER — CARVEDILOL 25 MG/1
25 TABLET ORAL 2 TIMES DAILY WITH MEALS
Status: DISCONTINUED | OUTPATIENT
Start: 2021-01-18 | End: 2021-01-19 | Stop reason: HOSPADM

## 2021-01-18 RX ORDER — SODIUM CHLORIDE 9 MG/ML
INJECTION, SOLUTION INTRAVENOUS CONTINUOUS
Status: ACTIVE | OUTPATIENT
Start: 2021-01-18 | End: 2021-01-18

## 2021-01-18 RX ORDER — SODIUM CHLORIDE 9 MG/ML
INJECTION, SOLUTION INTRAVENOUS CONTINUOUS
Status: DISCONTINUED | OUTPATIENT
Start: 2021-01-18 | End: 2021-01-19 | Stop reason: HOSPADM

## 2021-01-18 RX ORDER — SODIUM CHLORIDE 0.9 % (FLUSH) 0.9 %
10 SYRINGE (ML) INJECTION EVERY 12 HOURS SCHEDULED
Status: DISCONTINUED | OUTPATIENT
Start: 2021-01-18 | End: 2021-01-19 | Stop reason: HOSPADM

## 2021-01-18 RX ADMIN — LOSARTAN POTASSIUM 100 MG: 50 TABLET, FILM COATED ORAL at 16:04

## 2021-01-18 RX ADMIN — ASPIRIN 81 MG: 81 TABLET, CHEWABLE ORAL at 20:30

## 2021-01-18 RX ADMIN — FAMOTIDINE 40 MG: 20 TABLET ORAL at 18:11

## 2021-01-18 RX ADMIN — ONDANSETRON 4 MG: 2 INJECTION INTRAMUSCULAR; INTRAVENOUS at 09:40

## 2021-01-18 RX ADMIN — TICAGRELOR 90 MG: 90 TABLET ORAL at 20:30

## 2021-01-18 RX ADMIN — Medication 10 ML: at 20:31

## 2021-01-18 RX ADMIN — IOVERSOL 30 ML: 678 INJECTION INTRA-ARTERIAL; INTRAVENOUS at 10:15

## 2021-01-18 NOTE — PROGRESS NOTES
Remainder of air removed from vasc-band. Vasc-band removed by AH, RT. No bleeding or hematoma. Quik clot and tegaderm dressing applied. Will continue to monitor.

## 2021-01-18 NOTE — PROGRESS NOTES
Pt arrived to pre/post cath, alert and oriented. Vital signs obtained. Consent for procedure obtained. Prepping pt for procedure.

## 2021-01-18 NOTE — PROGRESS NOTES
Returned from the [employer] [facility]. Is alert and orienteded times three. No bleeding or hematoma noted to right radial site.

## 2021-01-18 NOTE — FLOWSHEET NOTE
Pt arrived to floor vitals taken. Right wrist dressing intact. No swelling or bleeding noted at this time. Capillary refill is good. Will continue to monitor. 1330 no changes noted to right wrist. Pt has no complaints will continue to monitor. 1600 Wrist remains unchanged pt is resting in bed with call light at side. 1800 Pt up in chair no requests at this time.

## 2021-01-18 NOTE — BRIEF OP NOTE
Section of Cardiology  Adult Brief Cardiac Cath Procedure Note        Procedure(s):  RCA PCI    Pre-operative Diagnosis:  angina    H&P Status: Completed and reviewed. Post-operative Diagnosis:      RCA: large caliber, stents from prox to distal, 99% ISR distally.    PLVB and PDA with mild disease    Findings:  See full report    Complications:  none    Primary Proceduralist:   Dr.Wes Connor DO    Plan  dAPT  RFM      Full procedure note to follow

## 2021-01-18 NOTE — PROGRESS NOTES
Pt returned from cath lab to pre/post cath, alert and oriented. Vasc-band to R wrist intact, no bleeding or hematoma. VSS. Will continue to monitor.

## 2021-01-19 VITALS
DIASTOLIC BLOOD PRESSURE: 60 MMHG | RESPIRATION RATE: 16 BRPM | HEART RATE: 86 BPM | OXYGEN SATURATION: 98 % | TEMPERATURE: 97.9 F | HEIGHT: 61 IN | BODY MASS INDEX: 38.33 KG/M2 | WEIGHT: 203 LBS | SYSTOLIC BLOOD PRESSURE: 131 MMHG

## 2021-01-19 LAB
ANION GAP SERPL CALCULATED.3IONS-SCNC: 18 MEQ/L (ref 9–15)
BUN BLDV-MCNC: 16 MG/DL (ref 8–23)
CALCIUM SERPL-MCNC: 9.8 MG/DL (ref 8.5–9.9)
CHLORIDE BLD-SCNC: 99 MEQ/L (ref 95–107)
CO2: 19 MEQ/L (ref 20–31)
CREAT SERPL-MCNC: 0.72 MG/DL (ref 0.5–0.9)
GFR AFRICAN AMERICAN: >60
GFR NON-AFRICAN AMERICAN: >60
GLUCOSE BLD-MCNC: 112 MG/DL (ref 70–99)
HCT VFR BLD CALC: 41.7 % (ref 37–47)
HEMOGLOBIN: 14 G/DL (ref 12–16)
MCH RBC QN AUTO: 30.3 PG (ref 27–31.3)
MCHC RBC AUTO-ENTMCNC: 33.6 % (ref 33–37)
MCV RBC AUTO: 90 FL (ref 82–100)
PDW BLD-RTO: 14.6 % (ref 11.5–14.5)
PLATELET # BLD: 259 K/UL (ref 130–400)
POTASSIUM SERPL-SCNC: 3.7 MEQ/L (ref 3.4–4.9)
RBC # BLD: 4.63 M/UL (ref 4.2–5.4)
SODIUM BLD-SCNC: 136 MEQ/L (ref 135–144)
WBC # BLD: 12.3 K/UL (ref 4.8–10.8)

## 2021-01-19 PROCEDURE — 99217 PR OBSERVATION CARE DISCHARGE MANAGEMENT: CPT | Performed by: INTERNAL MEDICINE

## 2021-01-19 PROCEDURE — 36415 COLL VENOUS BLD VENIPUNCTURE: CPT

## 2021-01-19 PROCEDURE — 85027 COMPLETE CBC AUTOMATED: CPT

## 2021-01-19 PROCEDURE — 80048 BASIC METABOLIC PNL TOTAL CA: CPT

## 2021-01-19 PROCEDURE — 6370000000 HC RX 637 (ALT 250 FOR IP): Performed by: INTERNAL MEDICINE

## 2021-01-19 RX ADMIN — TICAGRELOR 90 MG: 90 TABLET ORAL at 07:50

## 2021-01-19 RX ADMIN — CARVEDILOL 25 MG: 25 TABLET, FILM COATED ORAL at 07:50

## 2021-01-19 RX ADMIN — ASPIRIN 81 MG: 81 TABLET, CHEWABLE ORAL at 07:50

## 2021-01-19 RX ADMIN — LOSARTAN POTASSIUM 100 MG: 50 TABLET, FILM COATED ORAL at 07:51

## 2021-01-19 NOTE — PROGRESS NOTES
Spiritual Care Services     Summary of Visit:  Consult. Patient had questions about her code status and expressed her desire to have her code status changed to full code. This  explained to her that while she was here in [address], her code status had been full code. Patient expressed her satisfaction with that as she wanted to rescind her DNR. Patient was instructed to contact her PCP and ask him to make that status change. Her normal hospital is [facility] and her PCP is affiliated with [facility]. Patient acknowledged her understanding of this information. Patient was also instructed to bring in a copy of her Advanced Directives to [address] if she is to return here again. Patient acknowledged her understanding of that as well. Spiritual Assessment/Intervention/Outcomes:    Encounter Summary  Services provided to[de-identified] Patient  Referral/Consult From[de-identified] Nurse, Patient  Support System: Spouse  Place of Zoroastrianism: None  Continue Visiting: No  Complexity of Encounter: Low  Length of Encounter: 15 minutes  Advance Care Planning: Yes                 Advance Directives (For Healthcare)  Pre-existing DNR Comfort Care/DNR Arrest/DNI Order: Other (Comment)(Patient has a DNR at [facility] wished to change code status)  Healthcare Directive: Yes, patient has an advance directive for healthcare treatment  Type of Healthcare Directive: Durable power of  for health care, Living will  Copy in Chart: Other (Comment)(Copies at [facility])                Care Plan:    No follow up required    [id number]   Electronically signed by Toya Arambula on 1/19/21 at 1:27 PM EST     To reach a  for emotional and spiritual support, place an [facility] \[facility]\"" consult request.   If a  is needed immediately, dial 0 and ask to page the on-call .

## 2021-01-19 NOTE — DISCHARGE SUMMARY
Discharge Summary    Date: 1/19/2021  Patient Name: Dominique Miller YOB: 1955 Age: 78 y.o. Admit Date: 1/18/2021  Discharge Date: 1/19/2021  Discharge Condition: Good    Admission Diagnosis  Angina at rest [facility]) (I20.8)     Discharge Diagnosis  Active Problems: Angina at rest (HCC)Resolved Problems: * No resolved hospital problems. [facility] Stay  Narrative of Hospital Course:  Patient presented for elective PCI of the RCA. She underwent PTCA of the entire right SFA in-stent restenosis. She tolerated procedure well. She was continued on dual antiplatelet therapy. She had previous allergy to statins. She was discharged home in stable and satisfactory condition    Consultants:  IP CONSULT TO CARDIAC REHAB    Surgeries/procedures Performed:       Treatments:    Cardiac Medications        Discharge Plan/Disposition:  Home    Hospital/Incidental Findings Requiring Follow Up:    Patient Instructions:    Diet: Cardiac Diet    Activity:Activity as Tolerated  For number of days (if applicable): Other Instructions:    Provider Follow-Up:   No follow-ups on file.      Significant Diagnostic Studies:    Recent Labs:  Admission on 01/18/2021WBC                                           Date: 01/19/2021Value: 12.3*       Ref range: 4.8 - 10.8 K/uL    Status: FinalRBC                                           Date: 01/19/2021Value: 4.63        Ref range: 4.20 - 5.40 M/uL   Status: FinalHemoglobin                                    Date: 01/19/2021Value: 14.0        Ref range: 12.0 - 16.0 g/dL   Status: FinalHematocrit                                    Date: 01/19/2021Value: 41.7        Ref range: 37.0 - 47.0 %      Status: FinalMCV                                           Date: 01/19/2021Value: 90.0        Ref range: 82.0 - 100.0 fL    Status: [address]                                           Date: 01/19/2021Value: 30.3        Ref range: 27.0 - 31.3 pg     Status: [address] [facility] Date: 01/19/2021Value: 33.6        Ref range: 33.0 - 37.0 %      Status: FinalRDW                                           Date: 01/19/2021Value: 14.6*       Ref range: 11.5 - 14.5 %      Status: FinalPlatelets                                     Date: 01/19/2021Value: 259         Ref range: 130 - 400 K/uL     Status: Final------------    Radiology last 7 days:  No results found. Pending Labs   Order Current Status  Basic Metabolic Panel In process      Discharge Medications    Current Discharge Medication List    Current Discharge Medication List    Current Discharge Medication ListCONTINUE these medications which have NOT CHANGEDticagrelor (BRILINTA) 90 MG TABS tabletTake 90 mg by mouth 2 times dailyfamotidine (PEPCID) 40 MG tabletTake 40 mg by mouth daily carvedilol (COREG) 25 MG tabletTAKE 1 TABLET BY MOUTH TWICE DAILY WITH MEALSQty: 60 tablet Refills: 6Associated Diagnoses:Essential hypertensionmelatonin 3 MG TABS tabletTake 3 mg by mouth dailyCholecalciferol (VITAMIN D) 2000 units CAPS capsuleTake by mouth 2 TABS QDlosartan (COZAAR) 100 MG tabletTAKE 1 TABLET BY MOUTH ONCE DAILYQty: 90 tablet Refills: 1Associated Diagnoses:Essential hypertensionchlorthalidone (HYGROTON) 25 MG tabletTAKE ONE TABLET BY MOUTH ONCE DAILYQty: 90 tablet Refills: 3Comments: Please consider 90 day supplies to promote better adherenceAssociated Diagnoses:Essential hypertensionMultiple Vitamin (MULTI VITAMIN PO)Take by mouthTURMERIC POTake by mouthaspirin 81 MG tabletTake 81 mg by mouth daily 2 tabs qdclopidogrel (PLAVIX) 75 MG tabletTake 1 tablet by mouth dailyQty: 30 tablet Refills: 5nitroGLYCERIN (NITROSTAT) 0.4 MG SL tabletDISSOLVE ONE TABLET UNDER THE TONGUE EVERY 5 MINUTES AS NEEDED FOR CHEST PAIN.   DO NOT EXCEED A TOTAL OF 3 DOSES IN 15 MINUTESQty: 25 tablet Refills: 0Associated Diagnoses:Chronic coronary artery disease    Current Discharge Medication List    Time Spent on Discharge:2E] minutes were spent in

## 2021-01-19 NOTE — CONSULTS
Cardiac Rehab Consult Note  Name: Tatyana Devine  Age: 78 y.o. Gender: female    Chief Complaint:No chief complaint on file. Primary Care Provider: Felisa Koehler MD  InpatientTreatment Team: Treatment Team: Attending Provider: Mick Temple DO; : Scot Serna; Registered Nurse: José Luis Meade RN; Patient Care Tech: Imelda Stokes; Utilization Reviewer: Magaly Oconnell RN; : Shital Kaplan  Admission Date: 1/18/2021    Consult Received from Dr. Jacques Thompson. Reason for consult PCI. Patient visited at bedside. Program and benefits introduced. Brochures given. Patient remains interested, based on insurance coverage. Active Problems:    Angina at rest [facility])  Resolved Problems:    * No resolved hospital problems. *       Plan: F/u w/ patient when department reopening. All of pt questions were answered. Case will be discussed with physician when appropriate.      Electronically signed by Martha Gerard PTA on 1/19/2021 at 8:36 AM

## 2021-01-19 NOTE — FLOWSHEET NOTE
PM assessment completed. VSS. Patient denies complaints of chest pain or shortness of breath. Right radial dressing dry and intact. Patient denies pain at site. No sign of hematoma or bleeding. Old bruising on arm. Patient resting in bed. Voices no needs at this time.

## 2021-01-19 NOTE — FLOWSHEET NOTE
Assessment complete. Right wrist has no swelling noted, but does have some bruising. No bleeding or hematoma noted at this time. Will continue to monitor. 1048 Pt discharged instructions discussed in detail. She has no questions or concerns. She demonstrated good understanding.  Her  will be here to pick her up at 1112

## 2021-01-20 LAB
PERFORMED ON: ABNORMAL
POC ACTIVATED CLOTTING TIME KAOLIN: 257 SEC (ref 82–152)
POC SAMPLE TYPE: ABNORMAL

## 2021-01-25 ENCOUNTER — HOSPITAL ENCOUNTER (OUTPATIENT)
Dept: ULTRASOUND IMAGING | Age: 66
Discharge: HOME | End: 2021-01-27
Payer: MEDICARE

## 2021-01-25 DIAGNOSIS — M79.604 BILATERAL LEG PAIN: ICD-10-CM

## 2021-01-25 DIAGNOSIS — M79.605 BILATERAL LEG PAIN: ICD-10-CM

## 2021-01-25 PROCEDURE — 93925 LOWER EXTREMITY STUDY: CPT

## 2021-02-18 ENCOUNTER — OFFICE VISIT (OUTPATIENT)
Dept: CARDIOLOGY CLINIC | Age: 66
End: 2021-02-18
Payer: COMMERCIAL

## 2021-02-18 VITALS
WEIGHT: 203 LBS | SYSTOLIC BLOOD PRESSURE: 120 MMHG | TEMPERATURE: 98.2 F | OXYGEN SATURATION: 97 % | HEART RATE: 99 BPM | BODY MASS INDEX: 38.36 KG/M2 | DIASTOLIC BLOOD PRESSURE: 70 MMHG

## 2021-02-18 DIAGNOSIS — E66.9 OBESITY (BMI 30-39.9): ICD-10-CM

## 2021-02-18 DIAGNOSIS — I25.10 CHRONIC CORONARY ARTERY DISEASE: Primary | ICD-10-CM

## 2021-02-18 DIAGNOSIS — E78.2 MIXED HYPERLIPIDEMIA: ICD-10-CM

## 2021-02-18 DIAGNOSIS — I20.8 ANGINA AT REST (HCC): ICD-10-CM

## 2021-02-18 DIAGNOSIS — I10 ESSENTIAL HYPERTENSION: ICD-10-CM

## 2021-02-18 DIAGNOSIS — Z95.5 STATUS POST CORONARY ARTERY BALLOON DILATION: ICD-10-CM

## 2021-02-18 PROCEDURE — 99214 OFFICE O/P EST MOD 30 MIN: CPT | Performed by: INTERNAL MEDICINE

## 2021-02-18 NOTE — PROGRESS NOTES
Chief Complaint   Patient presents with    1 Month Follow-Up    Follow Up After Procedure     CATH/STENTS       Patient presents for initial medical evaluation. Patient is followed on a regular basis by Dr. Dejon Rodríguez MD. Hx of CAD s/p PCI in 2002 at [facility]. Has not seen a cardiologist for 5 yrs. NO recent stress test. States she had lower anterior rib pain the other day, does have some atypical CP here and there per patient. No smoking. She does have SOB with moderate exertion. Does have LE edema as well. compliant with meds. Pt denies  dyspnea,  change in exercise capacity, fatigue,  nausea, vomiting, diarrhea, constipation, motor weakness, insomnia, weight loss, syncope, dizziness, lightheadedness, palpitations, PND, orthopnea. States she has b/l leg pain all over. States she has bad knees. Does have a hx of statin intolerance. Has tried lipitor, zocor and Pracachol per patient. Does have a hx of DVT and factor V leiden and is on [facility] [address] for [employer]. 5-30-17:states her symptoms have resolved. Does have SOB real bad she states however. Pt denies chest pain, , change in exercise capacity, fatigue,  nausea, vomiting, diarrhea, constipation, motor weakness, insomnia, weight loss, syncope, dizziness, lightheadedness, palpitations, PND, orthopnea, or claudication. No nitro use. BP and hr are good. CAD is stable. No LE discoloration or ulcers. No LE edema. No CHF type symptoms. Lipid profile is normal. Tolerating livalo ok so far. States she is tired and would like to get off inderal.     S/p DSE:     RESULTS: Resting EKG revealed heart rate of 81 beats per minute. No  diagnostic ST-T wave changes were seen. Frequent PVCs were noted. At peak  infusion of IV dobutamine. Blood pressure response was hypertensive. Maximum  systolic blood pressure 054 mmHg, heart rate response was blunted. Maximum  heart rate was 107 beats per minute.  This may have been due to beta blocker  therapy.      RESULTS: Resting cardiogram showed normal valvular structures. Left  ventricular ejection fraction normal at 60%. Pericardium is normal.   Periaorta is normal. At peak infusion, IV dobutamine, there was normal  augmentation contractility of left ventricle without evidence of infarcted or  ischemic myocardium. There was incremental left ventricular ejection fraction  from 60% to 80% at peak infusion with IV dobutamine.     IMPRESSION:   1. Blunted heart response secondary to beta blocker therapy. 2. No definite evidence of infarcted or ischemic myocardium. 3. Augmentation of left ventricular fraction from 60% to 80% with exercise. 4. Frequent PVCs were noted. 5. Hypertensive response to IV Dobutamine.     11-30-17: Pt denies chest pain, dyspnea, dyspnea on exertion, change in exercise capacity, fatigue,  nausea, vomiting, diarrhea, constipation, motor weakness, insomnia, weight loss, syncope, dizziness, lightheadedness, palpitations, PND, orthopnea, or claudication. No nitro use. BP and hr are good. CAD is stable. No LE discoloration or ulcers. No LE edema. No CHF type symptoms. Lipid profile is normal. She did not tolerate Livalo as well, taking tumeric. No smoking. Felt better after taken off of inderal and switched to coreg. No nitro use. BP and hr are good. CAD is stable. No LE discoloration or ulcers. No LE edema. No CHF type symptoms. Lipid profile is abnormal. Non complaint with CPAP. 5-31-18: feels better off of inderal, now on coreg. Feeling well overall. Feels bottom of her feet feel numb. Pt denies chest pain, dyspnea, dyspnea on exertion, change in exercise capacity, fatigue,  nausea, vomiting, diarrhea, constipation, motor weakness, insomnia, weight loss, syncope, dizziness, lightheadedness, palpitations, PND, orthopnea, or claudication. No nitro use. BP and hr are good. CAD is stable. No LE discoloration or ulcers. No LE edema. No CHF type symptoms. Lipid profile is abnormal. No recent hospitalization.  No change in meds. Does have a hx of DVT and factor V leiden and is supposed to be on  [employer] life but she is not taking it.has lost 4 pounds. Does have statin intolerance. 3-7-19: Pt denies chest pain, dyspnea, dyspnea on exertion, change in exercise capacity, fatigue,  nausea, vomiting, diarrhea, constipation, motor weakness, insomnia, weight loss, syncope, dizziness, lightheadedness, palpitations, PND, orthopnea, or claudication. No nitro use. BP and hr are good. CAD is stable. No LE discoloration or ulcers. No LE edema. No CHF type symptoms. Lipid profile is ABnormal, LDL is 121, , Trig 287. No recent hospitalization. No change in meds. NO smoking. Does have a hx of statin intolerance. Has tried lipitor, zocor and Pracachol per patient. Does have a hx of DVT and factor V leiden and is on [employer] for life. 9-19-19: not on [employer]. On ASA only. hx of DVT and factor V leiden defiency. Pt denies chest pain, dyspnea, dyspnea on exertion, change in exercise capacity, fatigue,  nausea, vomiting, diarrhea, constipation, motor weakness, insomnia, weight loss, syncope, dizziness, lightheadedness, palpitations, PND, orthopnea, or claudication. No nitro use. BP and hr are good. No LE discoloration or ulcers. No LE edema. No CHF type symptoms. Lipid profile is normal. No recent hospitalization. No change in meds. Did not tolerate Zetia, had blisters on her arms. 10-27-20: Patient complains of atypical chest pain from time to time. Feels like a dull type of pain in the middle of her chest.  She states she underwent stress test in July 2020 at [facility] which was negative per patient. She states she has GERD as well and pain goes up into her shoulders bilaterally. She also has associated shortness of breath and some nausea. States chest pain occurs 3 times a week. Longest episode lasted approximately 10 minutes, she took nitro 1 time and it relieved her symptoms. Patient could not tolerate Livalo.   She has a history of DVT and factor V Leiden deficiency, she is not on oral anticoagulation due to patient refusing. EKG with normal sinus rhythm, nonspecific ST changes, incomplete right bundle branch block. 1/5/2021: States she has horrible pain in her feet. States her feet are numb all the time. She denies history of diabetes. She has not had any evaluation with podiatry or neurology. He denies any recent vascular evaluation. Patient with history of DVT in left lower extremity and history of factor V Leiden deficiency she is not on any oral anticoagulation. She continues to have some atypical chest discomfort. She status post recent negative nuclear stress test in July 2020. Patient with history of hypertension, hyperlipidemia and obesity. Patient does have a history of statin intolerance. Not needing CPAP now. Blood pressure and heart rate are within normal limits today. EKG previously with normal sinus rhythm and incomplete right bundle branch block. Patient with history of coronary status post PCI at [facility] in 2002.    2/18/2021: Status post left heart catheterization with PCI of the RCA which was 99% in-stent restenosis distally. PLV and PDA with mild diffuse disease. Also status post PCI of LAD and circumflex drug-eluting stents. She had 99% circumflex in 90% distal LAD. Patient is on dual antiplatelet therapy. She is doing well overall. Pt denies chest pain, dyspnea, dyspnea on exertion, change in exercise capacity, fatigue,  nausea, vomiting, diarrhea, constipation, motor weakness, insomnia, weight loss, syncope, dizziness, lightheadedness, palpitations, PND, orthopnea, or claudication. Patient Active Problem List   Diagnosis    Vitamin D deficiency    Status post coronary artery balloon dilation    Obstructive sleep apnea syndrome    Obesity (BMI 30-39. 9)    Long term current use of anticoagulant therapy    Hyperlipidemia    Hypertension    Gastroesophageal reflux disease    [facility]    Essential tremor    Stricture of esophagus    Deep vein thrombosis of lower extremity (HCC)    Chronic coronary artery disease    Gastroesophageal reflux disease without esophagitis    Statin intolerance    Arm paresthesia, right    Tear of lateral cartilage or meniscus of knee, current    Bilateral carpal tunnel syndrome    [facility]ina [facility] rest [facility])       Past Surgical History:   Procedure Laterality Date    CHOLECYSTECTOMY      CORONARY ANGIOPLASTY WITH STENT PLACEMENT      CYST REMOVAL      top of mouth, benign    HYSTERECTOMY      KNEE ARTHROSCOPY Bilateral     TONSILLECTOMY         Social History     Socioeconomic History    Marital status:      Spouse name: Not on file    Number of children: Not on file    Years of education: Not on file    Highest education level: Not on file   Occupational History    Not on file   Social Needs    Financial resource strain: Not on file    Food insecurity     Worry: Not on file     Inability: Not on file    Transportation needs     Medical: Not on file     Non-medical: Not on file   Tobacco Use    Smoking status: Never Smoker    Smokeless tobacco: Never Used   Substance and Sexual Activity    Alcohol use: No    Drug use: Not on file    Sexual activity: Not on file   Lifestyle    Physical activity     Days per week: Not on file     Minutes per session: Not on file    Stress: Not on file   Relationships    Social connections     Talks on phone: Not on file     Gets together: Not on file     Attends Yarsani service: Not on file     Active member of club or organization: Not on file     Attends meetings of clubs or organizations: Not on file     Relationship status: Not on file    Intimate partner violence     Fear of current or ex partner: Not on file     Emotionally abused: Not on file     Physically abused: Not on file     Forced sexual activity: Not on file   Other Topics Concern    Not on file   Social History Narrative  Not on file       Family History   Problem Relation Age of Onset    High Blood Pressure Mother     High Cholesterol Mother     Heart Disease Mother     High Blood Pressure Father     High Cholesterol Father     Heart Disease Brother     Arthritis Brother     Heart Disease Sister     Heart Disease Sister        Current Outpatient Medications   Medication Sig Dispense Refill    ticagrelor (BRILINTA) 90 MG TABS tablet Take 90 mg by mouth 2 times daily      famotidine (PEPCID) 40 MG tablet Take 40 mg by mouth daily       carvedilol (COREG) 25 MG tablet TAKE 1 TABLET BY MOUTH TWICE DAILY WITH MEALS (Patient taking differently: Take 25 mg by mouth 2 times daily (with meals) ) 60 tablet 6    nitroGLYCERIN (NITROSTAT) 0.4 MG SL tablet DISSOLVE ONE TABLET UNDER THE TONGUE EVERY 5 MINUTES AS NEEDED FOR CHEST PAIN. DO NOT EXCEED A TOTAL OF 3 DOSES IN 15 MINUTES 25 tablet 0    melatonin 3 MG TABS tablet Take 3 mg by mouth daily      Cholecalciferol (VITAMIN D) 2000 units CAPS capsule Take by mouth 2 TABS QD      losartan (COZAAR) 100 MG tablet TAKE 1 TABLET BY MOUTH ONCE DAILY (Patient taking differently: Take 100 mg by mouth daily ) 90 tablet 1    chlorthalidone (HYGROTON) 25 MG tablet TAKE ONE TABLET BY MOUTH ONCE DAILY (Patient taking differently: Take 25 mg by mouth daily ) 90 tablet 3    Multiple Vitamin (MULTI VITAMIN PO) Take by mouth      TURMERIC PO Take by mouth      aspirin 81 MG tablet Take 81 mg by mouth daily        No current facility-administered medications for this visit. Latex, Amlodipine, Atorvastatin, Cephalexin, Ciprofloxacin, Codeine, Livalo [pitavastatin], Metoclopramide, Pravastatin, Simvastatin, Sulfa antibiotics, Adhesive tape, and Morphine    Review of Systems:  General ROS: negative  Psychological ROS: negative  Hematological and Lymphatic ROS: No history of blood clots or bleeding disorder.    Respiratory ROS: no cough, shortness of breath, or wheezing  Cardiovascular ROS: no chest pain or dyspnea on exertion  Gastrointestinal ROS: no abdominal pain, change in bowel habits, or black or bloody stools  Genito-Urinary ROS: no dysuria, trouble voiding, or hematuria  Musculoskeletal ROS: negative  Neurological ROS: no TIA or stroke symptoms  Dermatological ROS: negative    VITALS:  Blood pressure 120/70, pulse 99, temperature 98.2 °F (36.8 °C), temperature source Infrared, weight 203 lb (92.1 kg), last menstrual period 11/26/1989, SpO2 97 %. Body mass index is 38.36 kg/m². Physical Examination:  General appearance - alert, well appearing, and in no distress  Mental status - alert, oriented to person, place, and time  Neck - Neck is supple, no JVD or carotid bruits. No thyromegaly or adenopathy. Chest - clear to auscultation, no wheezes, rales or rhonchi, symmetric air entry  Heart - normal rate, regular rhythm, normal S1, S2, no murmurs, rubs, clicks or gallops  Abdomen - soft, nontender, nondistended, no masses or organomegaly  Neurological - alert, oriented, normal speech, no focal findings or movement disorder noted  Extremities - peripheral pulses normal, no pedal edema, no clubbing or cyanosis  Skin - normal coloration and turgor, no rashes, no suspicious skin lesions noted      EKG: normal sinus rhythm, nonspecific ST and T waves changes    No orders of the defined types were placed in this encounter. ASSESSMENT:     Diagnosis Orders   1. Chronic coronary artery disease     2. Mixed hyperlipidemia     3. Essential hypertension     4. Obesity (BMI 30-39.9)     5. Angina at rest [facility])     6. Status post coronary artery balloon dilation         PLAN:     Patient will need to continue to follow up with you for their general medical care    As always, aggressive risk factor modification is strongly recommended. We should adhere to the [address] VII guidelines for HTN management and the NCEP ATP III guidelines for LDL-C management.     Cardiac diet is always recommended with low fat, cholesterol, calories and sodium. Continue medications at current doses. Cont with DAPT. Does not want Repatha. CPAP qhs    Weight loss discussed. Patient was advised and encouraged to check blood pressure at home or at a pharmacy, maintain a logbook, and also call us back if blood pressure are above the target ranges or if it is low. Patient clearly understands and agrees to the instructions. We will need to continue to monitor muscle and liver enzymes, BUN, CR, and electrolytes. Details of medical condition explained and patient was warned about adverse consequences of uncontrolled medical conditions and possible side effects of prescribed medications.

## 2021-02-27 PROCEDURE — 93925 LOWER EXTREMITY STUDY: CPT | Performed by: INTERNAL MEDICINE

## 2021-03-09 ENCOUNTER — TELEPHONE (OUTPATIENT)
Dept: CARDIOLOGY CLINIC | Age: 66
End: 2021-03-09

## 2021-03-09 NOTE — TELEPHONE ENCOUNTER
Can patient take One 51wan extract for the pain in her feet with all of her other meds. Especially Brilinta?

## 2021-08-24 ENCOUNTER — OFFICE VISIT (OUTPATIENT)
Dept: CARDIOLOGY CLINIC | Age: 66
End: 2021-08-24
Payer: COMMERCIAL

## 2021-08-24 VITALS
OXYGEN SATURATION: 96 % | HEART RATE: 92 BPM | SYSTOLIC BLOOD PRESSURE: 138 MMHG | WEIGHT: 194 LBS | DIASTOLIC BLOOD PRESSURE: 88 MMHG | TEMPERATURE: 96.4 F | BODY MASS INDEX: 36.66 KG/M2

## 2021-08-24 DIAGNOSIS — Z95.5 STATUS POST CORONARY ARTERY BALLOON DILATION: ICD-10-CM

## 2021-08-24 DIAGNOSIS — G47.33 OBSTRUCTIVE SLEEP APNEA SYNDROME: ICD-10-CM

## 2021-08-24 DIAGNOSIS — E78.2 MIXED HYPERLIPIDEMIA: ICD-10-CM

## 2021-08-24 DIAGNOSIS — I10 ESSENTIAL HYPERTENSION: ICD-10-CM

## 2021-08-24 DIAGNOSIS — E66.9 OBESITY (BMI 30-39.9): ICD-10-CM

## 2021-08-24 DIAGNOSIS — I25.10 CHRONIC CORONARY ARTERY DISEASE: Primary | ICD-10-CM

## 2021-08-24 DIAGNOSIS — Z78.9 STATIN INTOLERANCE: ICD-10-CM

## 2021-08-24 PROBLEM — I20.89 ANGINA AT REST: Status: RESOLVED | Noted: 2020-10-27 | Resolved: 2021-08-24

## 2021-08-24 PROBLEM — I20.8 ANGINA AT REST (HCC): Status: RESOLVED | Noted: 2020-10-27 | Resolved: 2021-08-24

## 2021-08-24 PROCEDURE — 99214 OFFICE O/P EST MOD 30 MIN: CPT | Performed by: INTERNAL MEDICINE

## 2021-08-24 RX ORDER — LOSARTAN POTASSIUM 100 MG/1
100 TABLET ORAL DAILY
Qty: 90 TABLET | Refills: 3 | Status: SHIPPED | OUTPATIENT
Start: 2021-08-24 | End: 2022-03-01 | Stop reason: DRUGHIGH

## 2021-08-24 RX ORDER — CARVEDILOL 25 MG/1
TABLET ORAL
Qty: 180 TABLET | Refills: 3 | Status: SHIPPED | OUTPATIENT
Start: 2021-08-24 | End: 2022-08-30 | Stop reason: SDUPTHER

## 2021-08-24 RX ORDER — CHLORTHALIDONE 25 MG/1
TABLET ORAL
Qty: 90 TABLET | Refills: 3 | Status: SHIPPED | OUTPATIENT
Start: 2021-08-24 | End: 2022-08-30 | Stop reason: SDUPTHER

## 2021-08-24 NOTE — PROGRESS NOTES
Chief Complaint   Patient presents with    Hypertension    Hyperlipidemia    6 Month Follow-Up       Patient presents for initial medical evaluation. Patient is followed on a regular basis by Dr. Adwoa Rolle MD. Hx of CAD s/p PCI in 2002 at [facility]. Has not seen a cardiologist for 5 yrs. NO recent stress test. States she had lower anterior rib pain the other day, does have some atypical CP here and there per patient. No smoking. She does have SOB with moderate exertion. Does have LE edema as well. compliant with meds. Pt denies  dyspnea,  change in exercise capacity, fatigue,  nausea, vomiting, diarrhea, constipation, motor weakness, insomnia, weight loss, syncope, dizziness, lightheadedness, palpitations, PND, orthopnea. States she has b/l leg pain all over. States she has bad knees. Does have a hx of statin intolerance. Has tried lipitor, zocor and Pracachol per patient. Does have a hx of DVT and factor V leiden and is on [facility] for life. 5-30-17:states her symptoms have resolved. Does have SOB real bad she states however. Pt denies chest pain, , change in exercise capacity, fatigue,  nausea, vomiting, diarrhea, constipation, motor weakness, insomnia, weight loss, syncope, dizziness, lightheadedness, palpitations, PND, orthopnea, or claudication. No nitro use. BP and hr are good. CAD is stable. No LE discoloration or ulcers. No LE edema. No CHF type symptoms. Lipid profile is normal. Tolerating livalo ok so far. States she is tired and would like to get off inderal.     S/p DSE:     RESULTS: Resting EKG revealed heart rate of 81 beats per minute. No  diagnostic ST-T wave changes were seen. Frequent PVCs were noted. At peak  infusion of IV dobutamine. Blood pressure response was hypertensive. Maximum  systolic blood pressure 559 mmHg, heart rate response was blunted. Maximum  heart rate was 107 beats per minute.  This may have been due to beta blocker  therapy.      RESULTS: Resting cardiogram showed normal valvular structures. Left  ventricular ejection fraction normal at 60%. Pericardium is normal.   Periaorta is normal. At peak infusion, IV dobutamine, there was normal  augmentation contractility of left ventricle without evidence of infarcted or  ischemic myocardium. There was incremental left ventricular ejection fraction  from 60% to 80% at peak infusion with IV dobutamine.     IMPRESSION:   1. Blunted heart response secondary to beta blocker therapy. 2. No definite evidence of infarcted or ischemic myocardium. 3. Augmentation of left ventricular fraction from 60% to 80% with exercise. 4. Frequent PVCs were noted. 5. Hypertensive response to IV Dobutamine.     11-30-17: Pt denies chest pain, dyspnea, dyspnea on exertion, change in exercise capacity, fatigue,  nausea, vomiting, diarrhea, constipation, motor weakness, insomnia, weight loss, syncope, dizziness, lightheadedness, palpitations, PND, orthopnea, or claudication. No nitro use. BP and hr are good. CAD is stable. No LE discoloration or ulcers. No LE edema. No CHF type symptoms. Lipid profile is normal. She did not tolerate Livalo as well, taking tumeric. No smoking. Felt better after taken off of inderal and switched to coreg. No nitro use. BP and hr are good. CAD is stable. No LE discoloration or ulcers. No LE edema. No CHF type symptoms. Lipid profile is abnormal. Non complaint with CPAP. 5-31-18: feels better off of inderal, now on coreg. Feeling well overall. Feels bottom of her feet feel numb. Pt denies chest pain, dyspnea, dyspnea on exertion, change in exercise capacity, fatigue,  nausea, vomiting, diarrhea, constipation, motor weakness, insomnia, weight loss, syncope, dizziness, lightheadedness, palpitations, PND, orthopnea, or claudication. No nitro use. BP and hr are good. CAD is stable. No LE discoloration or ulcers. No LE edema. No CHF type symptoms. Lipid profile is abnormal. No recent hospitalization. No change in meds.  Does have a hx of DVT and factor V leiden and is supposed to be on  [employer] for life but she is not taking it.has lost 4 pounds. Does have statin intolerance. 3-7-19: Pt denies chest pain, dyspnea, dyspnea on exertion, change in exercise capacity, fatigue,  nausea, vomiting, diarrhea, constipation, motor weakness, insomnia, weight loss, syncope, dizziness, lightheadedness, palpitations, PND, orthopnea, or claudication. No nitro use. BP and hr are good. CAD is stable. No LE discoloration or ulcers. No LE edema. No CHF type symptoms. Lipid profile is ABnormal, LDL is 121, , Trig 287. No recent hospitalization. No change in meds. NO smoking. Does have a hx of statin intolerance. Has tried lipitor, zocor and Pracachol per patient. Does have a hx of DVT and factor V leiden and is on [employer] for life. 9-19-19: not on [employer]. On ASA only. hx of DVT and factor V leiden defiency. Pt denies chest pain, dyspnea, dyspnea on exertion, change in exercise capacity, fatigue,  nausea, vomiting, diarrhea, constipation, motor weakness, insomnia, weight loss, syncope, dizziness, lightheadedness, palpitations, PND, orthopnea, or claudication. No nitro use. BP and hr are good. No LE discoloration or ulcers. No LE edema. No CHF type symptoms. Lipid profile is normal. No recent hospitalization. No change in meds. Did not tolerate Zetia, had blisters on her arms. 10-27-20: Patient complains of atypical chest pain from time to time. Feels like a dull type of pain in the middle of her chest.  She states she underwent stress test in July 2020 at [facility] which was negative per patient. She states she has GERD as well and pain goes up into her shoulders bilaterally. She also has associated shortness of breath and some nausea. States chest pain occurs 3 times a week. Longest episode lasted approximately 10 minutes, she took nitro 1 time and it relieved her symptoms. Patient could not tolerate Livalo.   She has a history of DVT and factor V Leiden deficiency, she is not on oral anticoagulation due to patient refusing. EKG with normal sinus rhythm, nonspecific ST changes, incomplete right bundle branch block. 1/5/2021: States she has horrible pain in her feet. States her feet are numb all the time. She denies history of diabetes. She has not had any evaluation with podiatry or neurology. He denies any recent vascular evaluation. Patient with history of DVT in left lower extremity and history of factor V Leiden deficiency she is not on any oral anticoagulation. She continues to have some atypical chest discomfort. She status post recent negative nuclear stress test in July 2020. Patient with history of hypertension, hyperlipidemia and obesity. Patient does have a history of statin intolerance. Not needing CPAP now. Blood pressure and heart rate are within normal limits today. EKG previously with normal sinus rhythm and incomplete right bundle branch block. Patient with history of coronary status post PCI at [facility] in 2002.    2/18/2021: Status post left heart catheterization with PCI of the RCA which was 99% in-stent restenosis distally. PLV and PDA with mild diffuse disease. Also status post PCI of LAD and circumflex drug-eluting stents. She had 99% circumflex in 90% distal LAD. Patient is on dual antiplatelet therapy. She is doing well overall. Pt denies chest pain, dyspnea, dyspnea on exertion, change in exercise capacity, fatigue,  nausea, vomiting, diarrhea, constipation, motor weakness, insomnia, weight loss, syncope, dizziness, lightheadedness, palpitations, PND, orthopnea, or claudication. 8-24-21: hx of CAD s/p PCI. On DAPT. No bleeding issues. Pt denies chest pain, dyspnea, dyspnea on exertion, change in exercise capacity, fatigue,  nausea, vomiting, diarrhea, constipation, motor weakness, insomnia, weight loss, syncope, dizziness, lightheadedness, palpitations, PND, orthopnea, or claudication.   Patient with history of DVT in left lower extremity and history of factor V Leiden deficiency she is not on any oral anticoagulation. Needs to have dental work and colonoscopy. Did not undergo cardiac rehab yet. Patient Active Problem List   Diagnosis    Vitamin D deficiency    Status post coronary artery balloon dilation    Obstructive sleep apnea syndrome    Obesity (BMI 30-39. 9)    Long term current use of anticoagulant therapy    Hyperlipidemia    Hypertension    Gastroesophageal reflux disease    Factor V Leiden mutation ([address]    Essential tremor    Stricture of esophagus    Deep vein thrombosis of lower extremity (HCC)    Chronic coronary artery disease    Gastroesophageal reflux disease without esophagitis    Statin intolerance    Arm paresthesia, right    Tear of lateral cartilage or meniscus of knee, current    Bilateral carpal tunnel syndrome       Past Surgical History:   Procedure Laterality Date    CHOLECYSTECTOMY      CORONARY ANGIOPLASTY WITH STENT PLACEMENT      CYST REMOVAL      top of mouth, benign    HYSTERECTOMY      KNEE ARTHROSCOPY Bilateral     TONSILLECTOMY         Social History     Socioeconomic History    Marital status:      Spouse name: Not on file    Number of children: Not on file    Years of education: Not on file    Highest education level: Not on file   Occupational History    Not on file   Tobacco Use    Smoking status: Never Smoker    Smokeless tobacco: Never Used   Substance and Sexual Activity    Alcohol use: No    Drug use: Not on file    Sexual activity: Not on file   Other Topics Concern    Not on file   Social History Narrative    Not on file     Social Determinants of Health     Financial Resource Strain:     Difficulty of Paying Living Expenses:    Food Insecurity:     Worried About Running Out of Food in the Last Year:     Ran Out of Food in the Last Year:    Transportation Needs:     Lack of Transportation (Medical):      Lack of Transportation (Non-Medical):    Physical Activity:     Days of Exercise per Week:     Minutes of Exercise per Session:    Stress:     Feeling of Stress :    Social Connections:     Frequency of Communication with Friends and Family:     Frequency of Social Gatherings with Friends and Family:     Attends Voodoo Services:     Active Member of Clubs or Organizations:     Attends Club or Organization Meetings:     Marital Status:    Intimate Partner Violence:     Fear of Current or Ex-Partner:     Emotionally Abused:     Physically Abused:     Sexually Abused:        Family History   Problem Relation Age of Onset    High Blood Pressure Mother     High Cholesterol Mother     Heart Disease Mother     High Blood Pressure Father     High Cholesterol Father     Heart Disease Brother     Arthritis Brother     Heart Disease Sister     Heart Disease Sister        Current Outpatient Medications   Medication Sig Dispense Refill    ticagrelor (BRILINTA) 90 MG TABS tablet Take 1 tablet by mouth 2 times daily 60 tablet 5    famotidine (PEPCID) 40 MG tablet Take 40 mg by mouth daily       carvedilol (COREG) 25 MG tablet TAKE 1 TABLET BY MOUTH TWICE DAILY WITH MEALS (Patient taking differently: Take 25 mg by mouth 2 times daily (with meals) ) 60 tablet 6    nitroGLYCERIN (NITROSTAT) 0.4 MG SL tablet DISSOLVE ONE TABLET UNDER THE TONGUE EVERY 5 MINUTES AS NEEDED FOR CHEST PAIN.   DO NOT EXCEED A TOTAL OF 3 DOSES IN 15 MINUTES 25 tablet 0    melatonin 3 MG TABS tablet Take 3 mg by mouth daily      Cholecalciferol (VITAMIN D) 2000 units CAPS capsule Take by mouth 2 TABS QD      losartan (COZAAR) 100 MG tablet TAKE 1 TABLET BY MOUTH ONCE DAILY (Patient taking differently: Take 100 mg by mouth daily ) 90 tablet 1    chlorthalidone (HYGROTON) 25 MG tablet TAKE ONE TABLET BY MOUTH ONCE DAILY (Patient taking differently: Take 25 mg by mouth daily ) 90 tablet 3    Multiple Vitamin (MULTI VITAMIN PO) Take by mouth      TURMERIC PO Take by mouth      aspirin 81 MG tablet Take 81 mg by mouth daily        No current facility-administered medications for this visit. Latex, Amlodipine, Atorvastatin, Cephalexin, Ciprofloxacin, Codeine, Livalo [pitavastatin], Metoclopramide, Pravastatin, Simvastatin, Sulfa antibiotics, Adhesive tape, and Morphine    Review of Systems:  General ROS: negative  Psychological ROS: negative  Hematological and Lymphatic ROS: No history of blood clots or bleeding disorder. Respiratory ROS: no cough, shortness of breath, or wheezing  Cardiovascular ROS: no chest pain or dyspnea on exertion  Gastrointestinal ROS: no abdominal pain, change in bowel habits, or black or bloody stools  Genito-Urinary ROS: no dysuria, trouble voiding, or hematuria  Musculoskeletal ROS: negative  Neurological ROS: no TIA or stroke symptoms  Dermatological ROS: negative    VITALS:  Blood pressure 138/88, pulse 92, temperature 96.4 °F (35.8 °C), weight 194 lb (88 kg), last menstrual period 11/26/1989, SpO2 96 %. Body mass index is 36.66 kg/m². Physical Examination:  General appearance - alert, well appearing, and in no distress  Mental status - alert, oriented to person, place, and time  Neck - Neck is supple, no JVD or carotid bruits. No thyromegaly or adenopathy. Chest - clear to auscultation, no wheezes, rales or rhonchi, symmetric air entry  Heart - normal rate, regular rhythm, normal S1, S2, no murmurs, rubs, clicks or gallops  Abdomen - soft, nontender, nondistended, no masses or organomegaly  Neurological - alert, oriented, normal speech, no focal findings or movement disorder noted  Extremities - peripheral pulses normal, no pedal edema, no clubbing or cyanosis  Skin - normal coloration and turgor, no rashes, no suspicious skin lesions noted      EKG: normal sinus rhythm, nonspecific ST and T waves changes    No orders of the defined types were placed in this encounter. ASSESSMENT:     Diagnosis Orders   1. Chronic coronary artery disease     2. Essential hypertension  losartan (COZAAR) 100 MG tablet    chlorthalidone (HYGROTON) 25 MG tablet    carvedilol (COREG) 25 MG tablet   3. Obstructive sleep apnea syndrome     4. Obesity (BMI 30-39.9)     5. Statin intolerance     6. Status post coronary artery balloon dilation     7. Mixed hyperlipidemia         PLAN:     Patient will need to continue to follow up with you for their general medical care    As always, aggressive risk factor modification is strongly recommended. We should adhere to the [address] VII guidelines for HTN management and the NCEP ATP III guidelines for LDL-C management. Cardiac diet is always recommended with low fat, cholesterol, calories and sodium. Continue medications at current doses. Cont with DAPT. Ok to hold 5-7 days prior to procedures. Does not want Repatha. CPAP qhs    Weight loss discussed. Patient was advised and encouraged to check blood pressure at home or at a pharmacy, maintain a logbook, and also call us back if blood pressure are above the target ranges or if it is low. Patient clearly understands and agrees to the instructions. We will need to continue to monitor muscle and liver enzymes, BUN, CR, and electrolytes. Details of medical condition explained and patient was warned about adverse consequences of uncontrolled medical conditions and possible side effects of prescribed medications.

## 2021-10-22 ENCOUNTER — TELEPHONE (OUTPATIENT)
Dept: CARDIOLOGY CLINIC | Age: 66
End: 2021-10-22

## 2021-10-22 NOTE — TELEPHONE ENCOUNTER
The patient called to alert you that she is noticing low BP especially in the morning after her AM medications. She noted 89/41 HR are normal 70's. She is feeling more fatigue than anything else. She does already have vertigo and a little more dizziness with positional changes. She is on Losartan 100mg daily and the carvedilol 25mg daily am and 1/2 tab in pm and chlorthalidone 25mg daily in the PM. She has also been limiting her sodium intake and a weight loss of 10# since she was last seen. Please advise, thank you.

## 2022-02-14 RX ORDER — TICAGRELOR 90 MG/1
TABLET ORAL
Qty: 60 TABLET | Refills: 0 | Status: SHIPPED | OUTPATIENT
Start: 2022-02-14 | End: 2022-03-01 | Stop reason: ALTCHOICE

## 2022-02-14 NOTE — TELEPHONE ENCOUNTER
Please approve or deny this refill request. The order is pended. Thank you.     LOV 8/24/2021    Next Visit Date:  Future Appointments   Date Time Provider Rocky Hinson   3/1/2022 10:30 AM Yandel Fallon DO [facility]

## 2022-03-01 ENCOUNTER — OFFICE VISIT (OUTPATIENT)
Dept: CARDIOLOGY CLINIC | Age: 67
End: 2022-03-01
Payer: COMMERCIAL

## 2022-03-01 VITALS
DIASTOLIC BLOOD PRESSURE: 86 MMHG | TEMPERATURE: 97.3 F | SYSTOLIC BLOOD PRESSURE: 122 MMHG | HEART RATE: 72 BPM | WEIGHT: 194 LBS | BODY MASS INDEX: 36.66 KG/M2

## 2022-03-01 DIAGNOSIS — D68.51 FACTOR V LEIDEN MUTATION (HCC): ICD-10-CM

## 2022-03-01 DIAGNOSIS — E78.2 MIXED HYPERLIPIDEMIA: ICD-10-CM

## 2022-03-01 DIAGNOSIS — I25.10 CHRONIC CORONARY ARTERY DISEASE: ICD-10-CM

## 2022-03-01 DIAGNOSIS — E66.9 OBESITY (BMI 30-39.9): ICD-10-CM

## 2022-03-01 DIAGNOSIS — I10 HYPERTENSION, UNSPECIFIED TYPE: Primary | ICD-10-CM

## 2022-03-01 PROCEDURE — 93000 ELECTROCARDIOGRAM COMPLETE: CPT | Performed by: INTERNAL MEDICINE

## 2022-03-01 PROCEDURE — 99213 OFFICE O/P EST LOW 20 MIN: CPT | Performed by: INTERNAL MEDICINE

## 2022-03-01 RX ORDER — CLOPIDOGREL BISULFATE 75 MG/1
75 TABLET ORAL DAILY
Qty: 30 TABLET | Refills: 6 | Status: SHIPPED | OUTPATIENT
Start: 2022-03-01 | End: 2022-08-30 | Stop reason: SDUPTHER

## 2022-03-01 RX ORDER — LOSARTAN POTASSIUM 50 MG/1
50 TABLET ORAL DAILY
COMMUNITY
End: 2022-03-22 | Stop reason: SDUPTHER

## 2022-03-01 NOTE — PROGRESS NOTES
Chief Complaint   Patient presents with    Coronary Artery Disease    Hypertension    Hyperlipidemia    6 Month Follow-Up       Patient presents for initial medical evaluation. Patient is followed on a regular basis by Dr. Shailesh Kinney MD. Hx of CAD s/p PCI in 2002 at [facility]. Has not seen a cardiologist for 5 yrs. NO recent stress test. States she had lower anterior rib pain the other day, does have some atypical CP here and there per patient. No smoking. She does have SOB with moderate exertion. Does have LE edema as well. compliant with meds. Pt denies  dyspnea,  change in exercise capacity, fatigue,  nausea, vomiting, diarrhea, constipation, motor weakness, insomnia, weight loss, syncope, dizziness, lightheadedness, palpitations, PND, orthopnea. States she has b/l leg pain all over. States she has bad knees. Does have a hx of statin intolerance. Has tried lipitor, zocor and Pracachol per patient. Does have a hx of DVT and factor V leiden and is on [address] [place], [place] [address]. 5-30-17:states her symptoms have resolved. Does have SOB real bad she states however. Pt denies chest pain, , change in exercise capacity, fatigue,  nausea, vomiting, diarrhea, constipation, motor weakness, insomnia, weight loss, syncope, dizziness, lightheadedness, palpitations, PND, orthopnea, or claudication. No nitro use. BP and hr are good. CAD is stable. No LE discoloration or ulcers. No LE edema. No CHF type symptoms. Lipid profile is normal. Tolerating livalo ok so far. States she is tired and would like to get off inderal.     S/p DSE:     RESULTS: Resting EKG revealed heart rate of 81 beats per minute. No  diagnostic ST-T wave changes were seen. Frequent PVCs were noted. At peak  infusion of IV dobutamine. Blood pressure response was hypertensive. Maximum  systolic blood pressure 365 mmHg, heart rate response was blunted. Maximum  heart rate was 107 beats per minute.  This may have been due to beta blocker  therapy.      RESULTS: Resting cardiogram showed normal valvular structures. Left  ventricular ejection fraction normal at 60%. Pericardium is normal.   Periaorta is normal. At peak infusion, IV dobutamine, there was normal  augmentation contractility of left ventricle without evidence of infarcted or  ischemic myocardium. There was incremental left ventricular ejection fraction  from 60% to 80% at peak infusion with IV dobutamine.     IMPRESSION:   1. Blunted heart response secondary to beta blocker therapy. 2. No definite evidence of infarcted or ischemic myocardium. 3. Augmentation of left ventricular fraction from 60% to 80% with exercise. 4. Frequent PVCs were noted. 5. Hypertensive response to IV Dobutamine.     11-30-17: Pt denies chest pain, dyspnea, dyspnea on exertion, change in exercise capacity, fatigue,  nausea, vomiting, diarrhea, constipation, motor weakness, insomnia, weight loss, syncope, dizziness, lightheadedness, palpitations, PND, orthopnea, or claudication. No nitro use. BP and hr are good. CAD is stable. No LE discoloration or ulcers. No LE edema. No CHF type symptoms. Lipid profile is normal. She did not tolerate Livalo as well, taking tumeric. No smoking. Felt better after taken off of inderal and switched to coreg. No nitro use. BP and hr are good. CAD is stable. No LE discoloration or ulcers. No LE edema. No CHF type symptoms. Lipid profile is abnormal. Non complaint with CPAP. 5-31-18: feels better off of inderal, now on coreg. Feeling well overall. Feels bottom of her feet feel numb. Pt denies chest pain, dyspnea, dyspnea on exertion, change in exercise capacity, fatigue,  nausea, vomiting, diarrhea, constipation, motor weakness, insomnia, weight loss, syncope, dizziness, lightheadedness, palpitations, PND, orthopnea, or claudication. No nitro use. BP and hr are good. CAD is stable. No LE discoloration or ulcers. No LE edema. No CHF type symptoms.  Lipid profile is abnormal. No recent hospitalization. No change in meds. Does have a hx of DVT and factor V leiden and is supposed to be on  [employer] for life but she is not taking it.has lost 4 pounds. Does have statin intolerance. 3-7-19: Pt denies chest pain, dyspnea, dyspnea on exertion, change in exercise capacity, fatigue,  nausea, vomiting, diarrhea, constipation, motor weakness, insomnia, weight loss, syncope, dizziness, lightheadedness, palpitations, PND, orthopnea, or claudication. No nitro use. BP and hr are good. CAD is stable. No LE discoloration or ulcers. No LE edema. No CHF type symptoms. Lipid profile is ABnormal, LDL is 121, , Trig 287. No recent hospitalization. No change in meds. NO smoking. Does have a hx of statin intolerance. Has tried lipitor, zocor and Pracachol per patient. Does have a hx of DVT and factor V leiden and is on [employer] for life. 9-19-19: not on [employer]. On ASA only. hx of DVT and factor V leiden defiency. Pt denies chest pain, dyspnea, dyspnea on exertion, change in exercise capacity, fatigue,  nausea, vomiting, diarrhea, constipation, motor weakness, insomnia, weight loss, syncope, dizziness, lightheadedness, palpitations, PND, orthopnea, or claudication. No nitro use. BP and hr are good. No LE discoloration or ulcers. No LE edema. No CHF type symptoms. Lipid profile is normal. No recent hospitalization. No change in meds. Did not tolerate Zetia, had blisters on her arms. 10-27-20: Patient complains of atypical chest pain from time to time. Feels like a dull type of pain in the middle of her chest.  She states she underwent stress test in July 2020 at [facility] which was negative per patient. She states she has GERD as well and pain goes up into her shoulders bilaterally. She also has associated shortness of breath and some nausea. States chest pain occurs 3 times a week. Longest episode lasted approximately 10 minutes, she took nitro 1 time and it relieved her symptoms.   Patient could not tolerate Samara. She has a history of DVT and factor V Leiden deficiency, she is not on oral anticoagulation due to patient refusing. EKG with normal sinus rhythm, nonspecific ST changes, incomplete right bundle branch block. 1/5/2021: States she has horrible pain in her feet. States her feet are numb all the time. She denies history of diabetes. She has not had any evaluation with podiatry or neurology. He denies any recent vascular evaluation. Patient with history of DVT in left lower extremity and history of factor V Leiden deficiency she is not on any oral anticoagulation. She continues to have some atypical chest discomfort. She status post recent negative nuclear stress test in July 2020. Patient with history of hypertension, hyperlipidemia and obesity. Patient does have a history of statin intolerance. Not needing CPAP now. Blood pressure and heart rate are within normal limits today. EKG previously with normal sinus rhythm and incomplete right bundle branch block. Patient with history of coronary status post PCI at [address] [place] [address] in 2002.    2/18/2021: Status post left heart catheterization with PCI of the RCA which was 99% in-stent restenosis distally. PLV and PDA with mild diffuse disease. Also status post PCI of LAD and circumflex drug-eluting stents. She had 99% circumflex in 90% distal LAD. Patient is on dual antiplatelet therapy. She is doing well overall. Pt denies chest pain, dyspnea, dyspnea on exertion, change in exercise capacity, fatigue,  nausea, vomiting, diarrhea, constipation, motor weakness, insomnia, weight loss, syncope, dizziness, lightheadedness, palpitations, PND, orthopnea, or claudication. 8-24-21: hx of CAD s/p PCI. On DAPT. No bleeding issues.  Pt denies chest pain, dyspnea, dyspnea on exertion, change in exercise capacity, fatigue,  nausea, vomiting, diarrhea, constipation, motor weakness, insomnia, weight loss, syncope, dizziness, lightheadedness, palpitations, PND, orthopnea, or claudication. Patient with history of DVT in left lower extremity and history of factor V Leiden deficiency she is not on any oral anticoagulation. Needs to have dental work and colonoscopy. Did not undergo cardiac rehab yet. 3/1/2022: Patient is doing okay overall. She denies any angina or CHF type symptoms. She has a history of coronary disease status post PCI. She remains on dual antiplatelet therapy. Patient with history of DVT in left lower extremity and history of factor V Leiden MUTATION. she is not on any oral anticoagulation. She canceled her colonoscopy did not have dental work done          Patient Active Problem List   Diagnosis    Vitamin D deficiency    Status post coronary artery balloon dilation    Obstructive sleep apnea syndrome    Obesity (BMI 30-39. 9)    Hyperlipidemia    Hypertension    Gastroesophageal reflux disease    Factor V Leiden mutation ([facility] [address]    Essential tremor    Stricture of esophagus    Deep vein thrombosis of lower extremity (HCC)    Chronic coronary artery disease    Gastroesophageal reflux disease without esophagitis    Statin intolerance    Arm paresthesia, right    Tear of lateral cartilage or meniscus of knee, current    Bilateral carpal tunnel syndrome       Past Surgical History:   Procedure Laterality Date    CHOLECYSTECTOMY      CORONARY ANGIOPLASTY WITH STENT PLACEMENT      CYST REMOVAL      top of mouth, benign    HYSTERECTOMY      KNEE ARTHROSCOPY Bilateral     TONSILLECTOMY         Social History     Socioeconomic History    Marital status:      Spouse name: Not on file    Number of children: Not on file    Years of education: Not on file    Highest education level: Not on file   Occupational History    Not on file   Tobacco Use    Smoking status: Never Smoker    Smokeless tobacco: Never Used   Substance and Sexual Activity    Alcohol use: No    Drug use: Not on file    Sexual activity: Not on file   Other Topics Concern    Not on file   Social History Narrative    Not on file     Social Determinants of Health     Financial Resource Strain:     Difficulty of Paying Living Expenses: Not on file   Food Insecurity:     Worried About Running Out of Food in the Last Year: Not on file    Thania of Food in the Last Year: Not on file   Transportation Needs:     Lack of Transportation (Medical): Not on file    Lack of Transportation (Non-Medical):  Not on file   Physical Activity:     Days of Exercise per Week: Not on file    Minutes of Exercise per Session: Not on file   Stress:     Feeling of Stress : Not on file   Social Connections:     Frequency of Communication with Friends and Family: Not on file    Frequency of Social Gatherings with Friends and Family: Not on file    Attends [employer] Services: Not on file    Active Member of [address] [place] [address] [employer] or Organizations: Not on file    Attends Club or Organization Meetings: Not on file    Marital Status: Not on file   Intimate Partner Violence:     Fear of Current or Ex-Partner: Not on file    Emotionally Abused: Not on file    Physically Abused: Not on file    Sexually Abused: Not on file   Housing Stability:     Unable to Pay for Housing in the Last Year: Not on file    Number of [place] in the Last Year: Not on file    Unstable Housing in the Last Year: Not on file       Family History   Problem Relation Age of Onset    High Blood Pressure Mother     High Cholesterol Mother     Heart Disease Mother     High Blood Pressure Father     High Cholesterol Father     Heart Disease Brother     Arthritis Brother     Heart Disease Sister     Heart Disease Sister        Current Outpatient Medications   Medication Sig Dispense Refill    losartan (COZAAR) 50 MG tablet Take 50 mg by mouth daily      clopidogrel (PLAVIX) 75 MG tablet Take 1 tablet by mouth daily 30 tablet 6    chlorthalidone (HYGROTON) 25 MG tablet TAKE ONE TABLET BY MOUTH ONCE DAILY 90 tablet 3  carvedilol (COREG) 25 MG tablet TAKE 1 TABLET BY MOUTH TWICE DAILY WITH MEALS 180 tablet 3    famotidine (PEPCID) 40 MG tablet Take 40 mg by mouth daily       nitroGLYCERIN (NITROSTAT) 0.4 MG SL tablet DISSOLVE ONE TABLET UNDER THE TONGUE EVERY 5 MINUTES AS NEEDED FOR CHEST PAIN. DO NOT EXCEED A TOTAL OF 3 DOSES IN 15 MINUTES 25 tablet 0    melatonin 3 MG TABS tablet Take 3 mg by mouth daily      Cholecalciferol (VITAMIN D) 2000 units CAPS capsule Take by mouth 2 TABS QD      Multiple Vitamin (MULTI VITAMIN PO) Take by mouth      TURMERIC PO Take by mouth      aspirin 81 MG tablet Take 81 mg by mouth daily        No current facility-administered medications for this visit. Latex, Amlodipine, Atorvastatin, Cephalexin, Ciprofloxacin, Codeine, Livalo [pitavastatin], Metoclopramide, Pravastatin, Simvastatin, Sulfa antibiotics, Adhesive tape, and Morphine    Review of Systems:  General ROS: negative  Psychological ROS: negative  Hematological and Lymphatic ROS: No history of blood clots or bleeding disorder. Respiratory ROS: no cough, shortness of breath, or wheezing  Cardiovascular ROS: no chest pain or dyspnea on exertion  Gastrointestinal ROS: no abdominal pain, change in bowel habits, or black or bloody stools  Genito-Urinary ROS: no dysuria, trouble voiding, or hematuria  Musculoskeletal ROS: negative  Neurological ROS: no TIA or stroke symptoms  Dermatological ROS: negative    VITALS:  Blood pressure 122/86, pulse 72, temperature 97.3 °F (36.3 °C), temperature source Infrared, weight 194 lb (88 kg), last menstrual period 11/26/1989. Body mass index is 36.66 kg/m². Physical Examination:  General appearance - alert, well appearing, and in no distress  Mental status - alert, oriented to person, place, and time  Neck - Neck is supple, no JVD or carotid bruits. No thyromegaly or adenopathy.    Chest - clear to auscultation, no wheezes, rales or rhonchi, symmetric air entry  Heart - normal rate, regular rhythm, normal S1, S2, no murmurs, rubs, clicks or gallops  Abdomen - soft, nontender, nondistended, no masses or organomegaly  Neurological - alert, oriented, normal speech, no focal findings or movement disorder noted  Extremities - peripheral pulses normal, no pedal edema, no clubbing or cyanosis  Skin - normal coloration and turgor, no rashes, no suspicious skin lesions noted      EKG: normal sinus rhythm, nonspecific ST and T waves changes    Orders Placed This Encounter   Procedures    EKG 12 Lead       ASSESSMENT:     Diagnosis Orders   1. Hypertension, unspecified type  EKG 12 Lead   2. Chronic coronary artery disease     3. Mixed hyperlipidemia     4. Obesity (BMI 30-39.9)     5. Factor V Leiden mutation [facility])         PLAN:     Patient will need to continue to follow up with you for their general medical care    As always, aggressive risk factor modification is strongly recommended. We should adhere to the [address] VII guidelines for HTN management and the NCEP ATP III guidelines for LDL-C management. Cardiac diet is always recommended with low fat, cholesterol, calories and sodium. Continue medications at current doses. Cont with DAPT. Ok to hold 5-7 days prior to procedures. Does not want Repatha. CPAP qhs    Weight loss discussed. Patient was advised and encouraged to check blood pressure at home or at a pharmacy, maintain a logbook, and also call us back if blood pressure are above the target ranges or if it is low. Patient clearly understands and agrees to the instructions. We will need to continue to monitor muscle and liver enzymes, BUN, CR, and electrolytes. Details of medical condition explained and patient was warned about adverse consequences of uncontrolled medical conditions and possible side effects of prescribed medications.

## 2022-03-22 NOTE — TELEPHONE ENCOUNTER
Please approve or deny this refill request. The order is pended. Thank you.     LOV 3/1/2022    Next Visit Date:  Future Appointments   Date Time Provider Rocky Hinson   8/30/2022  9:45 AM Yandel Rosas, DO [facility]

## 2022-03-23 RX ORDER — LOSARTAN POTASSIUM 50 MG/1
50 TABLET ORAL DAILY
Qty: 90 TABLET | Refills: 3 | Status: SHIPPED | OUTPATIENT
Start: 2022-03-23 | End: 2022-08-30 | Stop reason: SDUPTHER

## 2022-05-19 DIAGNOSIS — I25.10 CHRONIC CORONARY ARTERY DISEASE: ICD-10-CM

## 2022-05-19 RX ORDER — NITROGLYCERIN 0.4 MG/1
TABLET SUBLINGUAL
Qty: 25 TABLET | Refills: 3 | Status: SHIPPED | OUTPATIENT
Start: 2022-05-19

## 2022-08-30 ENCOUNTER — OFFICE VISIT (OUTPATIENT)
Dept: CARDIOLOGY CLINIC | Age: 67
End: 2022-08-30
Payer: COMMERCIAL

## 2022-08-30 VITALS
SYSTOLIC BLOOD PRESSURE: 120 MMHG | BODY MASS INDEX: 37.22 KG/M2 | WEIGHT: 197 LBS | DIASTOLIC BLOOD PRESSURE: 80 MMHG | HEART RATE: 81 BPM | OXYGEN SATURATION: 96 %

## 2022-08-30 DIAGNOSIS — I10 ESSENTIAL HYPERTENSION: ICD-10-CM

## 2022-08-30 DIAGNOSIS — I25.10 CORONARY ARTERY DISEASE INVOLVING NATIVE CORONARY ARTERY OF NATIVE HEART WITHOUT ANGINA PECTORIS: ICD-10-CM

## 2022-08-30 DIAGNOSIS — I73.9 PAD (PERIPHERAL ARTERY DISEASE) (HCC): ICD-10-CM

## 2022-08-30 DIAGNOSIS — I10 PRIMARY HYPERTENSION: ICD-10-CM

## 2022-08-30 DIAGNOSIS — Z78.9 STATIN INTOLERANCE: Primary | ICD-10-CM

## 2022-08-30 DIAGNOSIS — E78.2 MIXED HYPERLIPIDEMIA: ICD-10-CM

## 2022-08-30 PROCEDURE — 99213 OFFICE O/P EST LOW 20 MIN: CPT | Performed by: INTERNAL MEDICINE

## 2022-08-30 PROCEDURE — 1123F ACP DISCUSS/DSCN MKR DOCD: CPT | Performed by: INTERNAL MEDICINE

## 2022-08-30 RX ORDER — UBIDECARENONE 75 MG
50 CAPSULE ORAL DAILY
COMMUNITY

## 2022-08-30 RX ORDER — CHLORTHALIDONE 25 MG/1
TABLET ORAL
Qty: 90 TABLET | Refills: 3 | Status: SHIPPED | OUTPATIENT
Start: 2022-08-30

## 2022-08-30 RX ORDER — LOSARTAN POTASSIUM 50 MG/1
50 TABLET ORAL DAILY
Qty: 90 TABLET | Refills: 3 | Status: SHIPPED | OUTPATIENT
Start: 2022-08-30

## 2022-08-30 RX ORDER — CLOPIDOGREL BISULFATE 75 MG/1
75 TABLET ORAL DAILY
Qty: 90 TABLET | Refills: 3 | Status: SHIPPED | OUTPATIENT
Start: 2022-08-30

## 2022-08-30 RX ORDER — CARVEDILOL 25 MG/1
TABLET ORAL
Qty: 180 TABLET | Refills: 3 | Status: SHIPPED | OUTPATIENT
Start: 2022-08-30

## 2022-08-30 NOTE — PROGRESS NOTES
Chief Complaint   Patient presents with    Coronary Artery Disease    Hypertension    Hyperlipidemia    6 Month Follow-Up       Patient presents for initial medical evaluation. Patient is followed on a regular basis by Dr. Tammy Sanchez MD. Hx of CAD s/p PCI in 2002 at [facility]. Has not seen a cardiologist for 5 yrs. NO recent stress test. States she had lower anterior rib pain the other day, does have some atypical CP here and there per patient. No smoking. She does have SOB with moderate exertion. Does have LE edema as well. compliant with meds. Pt denies  dyspnea,  change in exercise capacity, fatigue,  nausea, vomiting, diarrhea, constipation, motor weakness, insomnia, weight loss, syncope, dizziness, lightheadedness, palpitations, PND, orthopnea. States she has b/l leg pain all over. States she has bad knees. Does have a hx of statin intolerance. Has tried lipitor, zocor and Pracachol per patient. Does have a hx of DVT and factor V leiden and is on [facility] [employer]. 5-30-17:states her symptoms have resolved. Does have SOB real bad she states however. Pt denies chest pain, , change in exercise capacity, fatigue,  nausea, vomiting, diarrhea, constipation, motor weakness, insomnia, weight loss, syncope, dizziness, lightheadedness, palpitations, PND, orthopnea, or claudication. No nitro use. BP and hr are good. CAD is stable. No LE discoloration or ulcers. No LE edema. No CHF type symptoms. Lipid profile is normal. Tolerating livalo ok so far. States she is tired and would like to get off inderal.     S/p DSE:     RESULTS: Resting EKG revealed heart rate of 81 beats per minute. No  diagnostic ST-T wave changes were seen. Frequent PVCs were noted. At peak  infusion of IV dobutamine. Blood pressure response was hypertensive. Maximum  systolic blood pressure 482 mmHg, heart rate response was blunted. Maximum  heart rate was 107 beats per minute. This may have been due to beta blocker  therapy.       RESULTS: Resting cardiogram showed normal valvular structures. Left  ventricular ejection fraction normal at 60%. Pericardium is normal.   Periaorta is normal. At peak infusion, IV dobutamine, there was normal  augmentation contractility of left ventricle without evidence of infarcted or  ischemic myocardium. There was incremental left ventricular ejection fraction  from 60% to 80% at peak infusion with IV dobutamine. IMPRESSION:   1. Blunted heart response secondary to beta blocker therapy. 2. No definite evidence of infarcted or ischemic myocardium. 3. Augmentation of left ventricular fraction from 60% to 80% with exercise. 4. Frequent PVCs were noted. 5. Hypertensive response to IV Dobutamine. 11-30-17: Pt denies chest pain, dyspnea, dyspnea on exertion, change in exercise capacity, fatigue,  nausea, vomiting, diarrhea, constipation, motor weakness, insomnia, weight loss, syncope, dizziness, lightheadedness, palpitations, PND, orthopnea, or claudication. No nitro use. BP and hr are good. CAD is stable. No LE discoloration or ulcers. No LE edema. No CHF type symptoms. Lipid profile is normal. She did not tolerate Livalo as well, taking tumeric. No smoking. Felt better after taken off of inderal and switched to coreg. No nitro use. BP and hr are good. CAD is stable. No LE discoloration or ulcers. No LE edema. No CHF type symptoms. Lipid profile is abnormal. Non complaint with CPAP. 5-31-18: feels better off of inderal, now on coreg. Feeling well overall. Feels bottom of her feet feel numb. Pt denies chest pain, dyspnea, dyspnea on exertion, change in exercise capacity, fatigue,  nausea, vomiting, diarrhea, constipation, motor weakness, insomnia, weight loss, syncope, dizziness, lightheadedness, palpitations, PND, orthopnea, or claudication. No nitro use. BP and hr are good. CAD is stable. No LE discoloration or ulcers. No LE edema. No CHF type symptoms. Lipid profile is abnormal. No recent hospitalization.  No change in meds. Does have a hx of DVT and factor V leiden and is supposed to be on  [employer] for life but she is not taking it.has lost 4 pounds. Does have statin intolerance. 3-7-19: Pt denies chest pain, dyspnea, dyspnea on exertion, change in exercise capacity, fatigue,  nausea, vomiting, diarrhea, constipation, motor weakness, insomnia, weight loss, syncope, dizziness, lightheadedness, palpitations, PND, orthopnea, or claudication. No nitro use. BP and hr are good. CAD is stable. No LE discoloration or ulcers. No LE edema. No CHF type symptoms. Lipid profile is ABnormal, LDL is 121, , Trig 287. No recent hospitalization. No change in meds. NO smoking. Does have a hx of statin intolerance. Has tried lipitor, zocor and Pracachol per patient. Does have a hx of DVT and factor V leiden and is on [employer] for life. 9-19-19: not on [employer]. On ASA only. hx of DVT and factor V leiden defiency. Pt denies chest pain, dyspnea, dyspnea on exertion, change in exercise capacity, fatigue,  nausea, vomiting, diarrhea, constipation, motor weakness, insomnia, weight loss, syncope, dizziness, lightheadedness, palpitations, PND, orthopnea, or claudication. No nitro use. BP and hr are good. No LE discoloration or ulcers. No LE edema. No CHF type symptoms. Lipid profile is normal. No recent hospitalization. No change in meds. Did not tolerate Zetia, had blisters on her arms. 10-27-20: Patient complains of atypical chest pain from time to time. Feels like a dull type of pain in the middle of her chest.  She states she underwent stress test in July 2020 at [facility] which was negative per patient. She states she has GERD as well and pain goes up into her shoulders bilaterally. She also has associated shortness of breath and some nausea. States chest pain occurs 3 times a week. Longest episode lasted approximately 10 minutes, she took nitro 1 time and it relieved her symptoms. Patient could not tolerate Livalo.   She has a history Patient with history of DVT in left lower extremity and history of factor V Leiden deficiency she is not on any oral anticoagulation. Needs to have dental work and colonoscopy. Did not undergo cardiac rehab yet. 3/1/2022: Patient is doing okay overall. She denies any angina or CHF type symptoms. She has a history of coronary disease status post PCI. She remains on dual antiplatelet therapy. Patient with history of DVT in left lower extremity and history of factor V Leiden MUTATION. she is not on any oral anticoagulation. She canceled her colonoscopy did not have dental work done      8/30/2020 history of coronary disease status post PCI. She remains on dual antiplatelet therapy. Patient with history of DVT in left lower extremity and history of factor V Leiden MUTATION. she is not on any oral anticoagulation. Status post extremity arterial duplex ultrasound in January 2021 with mild PAD evidenced by biphasic flow in bilateral lower extremity system. Patient Active Problem List   Diagnosis    Vitamin D deficiency    Status post coronary artery balloon dilation    Obstructive sleep apnea syndrome    Obesity (BMI 30-39. 9)    Hyperlipidemia    Hypertension    Gastroesophageal reflux disease    Factor V Leiden mutation ([address]    Essential tremor    Stricture of esophagus    Deep vein thrombosis of lower extremity (HCC)    Chronic coronary artery disease    Gastroesophageal reflux disease without esophagitis    Statin intolerance    Arm paresthesia, right    Tear of lateral cartilage or meniscus of knee, current    Bilateral carpal tunnel syndrome    Coronary artery disease involving native coronary artery of native heart without angina pectoris    PAD (peripheral artery disease) ([address]       Past Surgical History:   Procedure Laterality Date    CHOLECYSTECTOMY      CORONARY ANGIOPLASTY WITH STENT PLACEMENT      CYST REMOVAL      top of mouth, benign    HYSTERECTOMY (CERVIX STATUS UNKNOWN)      KNEE ARTHROSCOPY Bilateral     TONSILLECTOMY         Social History     Socioeconomic History    Marital status:    Tobacco Use    Smoking status: Never    Smokeless tobacco: Never   Substance and Sexual Activity    Alcohol use: No       Family History   Problem Relation Age of Onset    High Blood Pressure Mother     High Cholesterol Mother     Heart Disease Mother     High Blood Pressure Father     High Cholesterol Father     Heart Disease Brother     Arthritis Brother     Heart Disease Sister     Heart Disease Sister        Current Outpatient Medications   Medication Sig Dispense Refill    vitamin B-12 (CYANOCOBALAMIN) 100 MCG tablet Take 50 mcg by mouth daily      losartan (COZAAR) 50 MG tablet Take 1 tablet by mouth daily 90 tablet 3    clopidogrel (PLAVIX) 75 MG tablet Take 1 tablet by mouth daily 90 tablet 3    chlorthalidone (HYGROTON) 25 MG tablet TAKE ONE TABLET BY MOUTH ONCE DAILY 90 tablet 3    carvedilol (COREG) 25 MG tablet TAKE 1 TABLET BY MOUTH TWICE DAILY WITH MEALS 180 tablet 3    nitroGLYCERIN (NITROSTAT) 0.4 MG SL tablet DISSOLVE ONE TABLET UNDER THE TONGUE EVERY 5 MINUTES AS NEEDED FOR CHEST PAIN. DO NOT EXCEED A TOTAL OF 3 DOSES IN 15 MINUTES 25 tablet 3    Cholecalciferol (VITAMIN D) 2000 units CAPS capsule Take by mouth 2 TABS QD      aspirin 81 MG tablet Take 81 mg by mouth daily        No current facility-administered medications for this visit. Latex, Amlodipine, Atorvastatin, Cephalexin, Ciprofloxacin, Codeine, Livalo [pitavastatin], Metoclopramide, Pravastatin, Simvastatin, Sulfa antibiotics, Adhesive tape, and Morphine    Review of Systems:  General ROS: negative  Psychological ROS: negative  Hematological and Lymphatic ROS: No history of blood clots or bleeding disorder.    Respiratory ROS: no cough, shortness of breath, or wheezing  Cardiovascular ROS: no chest pain or dyspnea on exertion  Gastrointestinal ROS: no abdominal pain, change in bowel habits, or black or bloody stools  Genito-Urinary ROS: no dysuria, trouble voiding, or hematuria  Musculoskeletal ROS: negative  Neurological ROS: no TIA or stroke symptoms  Dermatological ROS: negative    VITALS:  Blood pressure 120/80, pulse 81, weight 197 lb (89.4 kg), last menstrual period 11/26/1989, SpO2 96 %. Body mass index is 37.22 kg/m². Physical Examination:  General appearance - alert, well appearing, and in no distress  Mental status - alert, oriented to person, place, and time  Neck - Neck is supple, no JVD or carotid bruits. No thyromegaly or adenopathy. Chest - clear to auscultation, no wheezes, rales or rhonchi, symmetric air entry  Heart - normal rate, regular rhythm, normal S1, S2, no murmurs, rubs, clicks or gallops  Abdomen - soft, nontender, nondistended, no masses or organomegaly  Neurological - alert, oriented, normal speech, no focal findings or movement disorder noted  Extremities - peripheral pulses normal, no pedal edema, no clubbing or cyanosis  Skin - normal coloration and turgor, no rashes, no suspicious skin lesions noted      EKG: normal sinus rhythm, nonspecific ST and T waves changes    No orders of the defined types were placed in this encounter. ASSESSMENT:     Diagnosis Orders   1. Statin intolerance        2. Essential hypertension  chlorthalidone (HYGROTON) 25 MG tablet    carvedilol (COREG) 25 MG tablet      3. Primary hypertension        4. Mixed hyperlipidemia        5. Coronary artery disease involving native coronary artery of native heart without angina pectoris        6. PAD (peripheral artery disease) (HCC)            PLAN:     Patient will need to continue to follow up with you for their general medical care    As always, aggressive risk factor modification is strongly recommended. We should adhere to the [address] guidelines for HTN management and the NCEP ATP III guidelines for LDL-C management.     Cardiac diet is always recommended with low fat, cholesterol, calories and

## 2023-05-30 ENCOUNTER — OFFICE VISIT (OUTPATIENT)
Dept: CARDIOLOGY CLINIC | Age: 68
End: 2023-05-30
Payer: COMMERCIAL

## 2023-05-30 VITALS
OXYGEN SATURATION: 96 % | HEIGHT: 62 IN | BODY MASS INDEX: 37.8 KG/M2 | SYSTOLIC BLOOD PRESSURE: 130 MMHG | WEIGHT: 205.4 LBS | HEART RATE: 78 BPM | DIASTOLIC BLOOD PRESSURE: 80 MMHG

## 2023-05-30 DIAGNOSIS — E78.2 MIXED HYPERLIPIDEMIA: ICD-10-CM

## 2023-05-30 DIAGNOSIS — I10 ESSENTIAL HYPERTENSION: Primary | ICD-10-CM

## 2023-05-30 DIAGNOSIS — I73.9 PAD (PERIPHERAL ARTERY DISEASE) (HCC): ICD-10-CM

## 2023-05-30 DIAGNOSIS — I10 PRIMARY HYPERTENSION: ICD-10-CM

## 2023-05-30 DIAGNOSIS — I25.10 CORONARY ARTERY DISEASE INVOLVING NATIVE CORONARY ARTERY OF NATIVE HEART WITHOUT ANGINA PECTORIS: ICD-10-CM

## 2023-05-30 DIAGNOSIS — E66.9 OBESITY (BMI 30-39.9): ICD-10-CM

## 2023-05-30 PROCEDURE — 99213 OFFICE O/P EST LOW 20 MIN: CPT | Performed by: INTERNAL MEDICINE

## 2023-05-30 PROCEDURE — 3075F SYST BP GE 130 - 139MM HG: CPT | Performed by: INTERNAL MEDICINE

## 2023-05-30 PROCEDURE — 93000 ELECTROCARDIOGRAM COMPLETE: CPT | Performed by: INTERNAL MEDICINE

## 2023-05-30 PROCEDURE — 3079F DIAST BP 80-89 MM HG: CPT | Performed by: INTERNAL MEDICINE

## 2023-05-30 PROCEDURE — 1123F ACP DISCUSS/DSCN MKR DOCD: CPT | Performed by: INTERNAL MEDICINE

## 2023-05-30 RX ORDER — ROSUVASTATIN CALCIUM 5 MG/1
TABLET, COATED ORAL
COMMUNITY
Start: 2023-05-08

## 2023-05-30 NOTE — PROGRESS NOTES
Patient with history of DVT in left lower extremity and history of factor V Leiden deficiency she is not on any oral anticoagulation. Needs to have dental work and colonoscopy. Did not undergo cardiac rehab yet. 3/1/2022: Patient is doing okay overall. She denies any angina or CHF type symptoms. She has a history of coronary disease status post PCI. She remains on dual antiplatelet therapy. Patient with history of DVT in left lower extremity and history of factor V Leiden MUTATION. she is not on any oral anticoagulation. She canceled her colonoscopy did not have dental work done      8/30/2020 history of coronary disease status post PCI. She remains on dual antiplatelet therapy. Patient with history of DVT in left lower extremity and history of factor V Leiden MUTATION. she is not on any oral anticoagulation. Status post extremity arterial duplex ultrasound in January 2021 with mild PAD evidenced by biphasic flow in bilateral lower extremity system. 5/30/2023: She does experience some heart skipping feeling at times. She does have a cough/  istory of coronary disease status post PCI. She remains on dual antiplatelet therapy. Patient with history of DVT in left lower extremity and history of factor V Leiden MUTATION. she is not on any oral anticoagulation. Patient with history of statin intolerance, now on statins twice a week  Status post extremity arterial duplex ultrasound in January 2021 with mild PAD evidenced by biphasic flow in bilateral lower extremity system  EKG with normal sinus rhythm, nonspecific st changes. Patient Active Problem List   Diagnosis    Vitamin D deficiency    Status post coronary artery balloon dilation    Obstructive sleep apnea syndrome    Obesity (BMI 30-39. 9)    Hyperlipidemia    Hypertension    Gastroesophageal reflux disease    Factor V Leiden mutation ([address]    Essential tremor    Stricture of esophagus    Deep vein thrombosis of lower extremity ([address]

## 2023-08-21 DIAGNOSIS — I10 ESSENTIAL HYPERTENSION: ICD-10-CM

## 2023-08-22 NOTE — TELEPHONE ENCOUNTER
Requesting medication refill. Please approve or deny this request.    Rx requested:  Requested Prescriptions     Pending Prescriptions Disp Refills    chlorthalidone (HYGROTON) 25 MG tablet [Pharmacy Med Name: Chlorthalidone 25 MG Oral Tablet] 90 tablet 0     Sig: Take 1 tablet by mouth once daily         Last Office Visit:   5/30/2023      Next Visit Date:  Future Appointments   Date Time Provider [address]   6/4/2024  8:30 AM Yandel Monroy DO [facility]               Last refill 8/30/2022. Please approve or deny. Patent

## 2023-08-25 RX ORDER — CHLORTHALIDONE 25 MG/1
TABLET ORAL
Qty: 90 TABLET | Refills: 3 | Status: SHIPPED | OUTPATIENT
Start: 2023-08-25

## 2023-11-08 ENCOUNTER — HOSPITAL ENCOUNTER (OUTPATIENT)
Dept: RADIOLOGY | Facility: HOSPITAL | Age: 68
Discharge: HOME | End: 2023-11-08
Payer: COMMERCIAL

## 2023-11-08 DIAGNOSIS — M85.80 OTHER SPECIFIED DISORDERS OF BONE DENSITY AND STRUCTURE, UNSPECIFIED SITE: ICD-10-CM

## 2023-11-08 DIAGNOSIS — Z78.0 ASYMPTOMATIC MENOPAUSAL STATE: ICD-10-CM

## 2023-11-08 PROCEDURE — 77080 DXA BONE DENSITY AXIAL: CPT | Performed by: RADIOLOGY

## 2023-11-08 PROCEDURE — 77080 DXA BONE DENSITY AXIAL: CPT

## 2023-12-09 DIAGNOSIS — I10 ESSENTIAL HYPERTENSION: ICD-10-CM

## 2023-12-10 RX ORDER — CLOPIDOGREL BISULFATE 75 MG/1
75 TABLET ORAL DAILY
Qty: 90 TABLET | Refills: 3 | Status: SHIPPED | OUTPATIENT
Start: 2023-12-10

## 2023-12-10 RX ORDER — CARVEDILOL 25 MG/1
TABLET ORAL
Qty: 180 TABLET | Refills: 3 | Status: SHIPPED | OUTPATIENT
Start: 2023-12-10

## 2023-12-18 ENCOUNTER — LAB (OUTPATIENT)
Dept: LAB | Facility: LAB | Age: 68
End: 2023-12-18
Payer: COMMERCIAL

## 2023-12-18 DIAGNOSIS — E78.2 MIXED HYPERLIPIDEMIA: Primary | ICD-10-CM

## 2023-12-18 LAB
CHOLEST SERPL-MCNC: 231 MG/DL (ref 0–199)
CHOLESTEROL/HDL RATIO: 4.4
HDLC SERPL-MCNC: 52.1 MG/DL
LDLC SERPL CALC-MCNC: 102 MG/DL
MAGNESIUM SERPL-MCNC: 1.71 MG/DL (ref 1.6–2.4)
NON HDL CHOLESTEROL: 179 MG/DL (ref 0–149)
TRIGL SERPL-MCNC: 387 MG/DL (ref 0–149)
VLDL: 77 MG/DL (ref 0–40)

## 2023-12-18 PROCEDURE — 83735 ASSAY OF MAGNESIUM: CPT

## 2023-12-18 PROCEDURE — 36415 COLL VENOUS BLD VENIPUNCTURE: CPT

## 2023-12-18 PROCEDURE — 80061 LIPID PANEL: CPT

## 2023-12-19 PROBLEM — M19.90 ARTHRITIS: Status: ACTIVE | Noted: 2023-12-19

## 2023-12-19 PROBLEM — E78.2 HYPERLIPIDEMIA, MIXED: Status: ACTIVE | Noted: 2023-12-19

## 2023-12-19 PROBLEM — J01.90 ACUTE SINUS INFECTION: Status: ACTIVE | Noted: 2023-12-19

## 2023-12-19 PROBLEM — Z78.0 POST-MENOPAUSAL: Status: ACTIVE | Noted: 2023-12-19

## 2023-12-19 PROBLEM — G47.00 INSOMNIA: Status: ACTIVE | Noted: 2023-12-19

## 2023-12-19 PROBLEM — H66.001 NON-RECURRENT ACUTE SUPPURATIVE OTITIS MEDIA OF RIGHT EAR WITHOUT SPONTANEOUS RUPTURE OF TYMPANIC MEMBRANE: Status: ACTIVE | Noted: 2023-12-19

## 2023-12-19 PROBLEM — G89.29 CHRONIC LEFT-SIDED LOW BACK PAIN WITHOUT SCIATICA: Status: ACTIVE | Noted: 2023-12-19

## 2023-12-19 PROBLEM — I25.118 ATHEROSCLEROSIS OF NATIVE CORONARY ARTERY OF NATIVE HEART WITH STABLE ANGINA PECTORIS (CMS-HCC): Status: ACTIVE | Noted: 2023-12-19

## 2023-12-19 PROBLEM — H90.3 BILATERAL SENSORINEURAL HEARING LOSS: Status: ACTIVE | Noted: 2023-12-19

## 2023-12-19 PROBLEM — R51.9 NEW ONSET HEADACHE: Status: ACTIVE | Noted: 2023-12-19

## 2023-12-19 PROBLEM — R42 VERTIGO: Status: ACTIVE | Noted: 2023-12-19

## 2023-12-19 PROBLEM — H10.33 ACUTE BACTERIAL CONJUNCTIVITIS OF BOTH EYES: Status: ACTIVE | Noted: 2023-12-19

## 2023-12-19 PROBLEM — R05.9 COUGH IN ADULT: Status: ACTIVE | Noted: 2023-12-19

## 2023-12-19 PROBLEM — M25.552 HIP PAIN, LEFT: Status: ACTIVE | Noted: 2023-12-19

## 2023-12-19 PROBLEM — K64.8 HEMORRHOID PROLAPSE: Status: ACTIVE | Noted: 2023-12-19

## 2023-12-19 PROBLEM — R19.7 DIARRHEA: Status: ACTIVE | Noted: 2023-12-19

## 2023-12-19 PROBLEM — M54.50 CHRONIC LEFT-SIDED LOW BACK PAIN WITHOUT SCIATICA: Status: ACTIVE | Noted: 2023-12-19

## 2023-12-19 PROBLEM — D17.23 LIPOMA OF RIGHT LOWER EXTREMITY: Status: ACTIVE | Noted: 2023-12-19

## 2023-12-19 PROBLEM — M54.50 BACK PAIN, LUMBOSACRAL: Status: ACTIVE | Noted: 2023-12-19

## 2023-12-19 PROBLEM — R10.9 LEFT FLANK PAIN: Status: ACTIVE | Noted: 2023-12-19

## 2023-12-19 PROBLEM — M85.80 OSTEOPENIA: Status: ACTIVE | Noted: 2023-12-19

## 2023-12-19 PROBLEM — E83.42 HYPOMAGNESEMIA: Status: ACTIVE | Noted: 2023-12-19

## 2023-12-19 RX ORDER — HYDROCORTISONE 25 MG/G
1 CREAM TOPICAL NIGHTLY
COMMUNITY
End: 2024-03-01 | Stop reason: ALTCHOICE

## 2023-12-19 RX ORDER — ACETAMINOPHEN 500 MG
1000 TABLET ORAL EVERY 8 HOURS PRN
COMMUNITY

## 2023-12-19 RX ORDER — ACETAMINOPHEN 500 MG
2000 TABLET ORAL DAILY
COMMUNITY

## 2023-12-19 RX ORDER — CARVEDILOL 25 MG/1
1 TABLET ORAL
COMMUNITY
Start: 2023-12-10

## 2023-12-19 RX ORDER — EZETIMIBE 10 MG/1
10 TABLET ORAL DAILY
COMMUNITY
End: 2024-03-01 | Stop reason: ALTCHOICE

## 2023-12-19 RX ORDER — NITROGLYCERIN 0.4 MG/1
0.4 TABLET SUBLINGUAL EVERY 5 MIN PRN
COMMUNITY
Start: 2014-06-13

## 2023-12-19 RX ORDER — CHLORTHALIDONE 25 MG/1
25 TABLET ORAL DAILY
COMMUNITY

## 2023-12-19 RX ORDER — ROSUVASTATIN CALCIUM 5 MG/1
5 TABLET, COATED ORAL
COMMUNITY
End: 2024-03-01 | Stop reason: ALTCHOICE

## 2023-12-19 RX ORDER — TOBRAMYCIN 3 MG/ML
2 SOLUTION/ DROPS OPHTHALMIC
COMMUNITY
Start: 2023-02-22 | End: 2024-03-01 | Stop reason: ALTCHOICE

## 2023-12-19 RX ORDER — CLOPIDOGREL BISULFATE 75 MG/1
1 TABLET ORAL DAILY
COMMUNITY
Start: 2023-12-10 | End: 2024-03-01 | Stop reason: ALTCHOICE

## 2023-12-19 RX ORDER — MECLIZINE HCL 12.5 MG 12.5 MG/1
25 TABLET ORAL
COMMUNITY
Start: 2019-11-06

## 2023-12-19 RX ORDER — LOSARTAN POTASSIUM 50 MG/1
50 TABLET ORAL DAILY
COMMUNITY
End: 2024-04-22

## 2023-12-19 RX ORDER — ASPIRIN 81 MG/1
1 TABLET ORAL DAILY
COMMUNITY

## 2024-01-08 ENCOUNTER — TELEMEDICINE (OUTPATIENT)
Dept: PRIMARY CARE | Facility: CLINIC | Age: 69
End: 2024-01-08
Payer: COMMERCIAL

## 2024-01-08 DIAGNOSIS — E83.42 HYPOMAGNESEMIA: ICD-10-CM

## 2024-01-08 DIAGNOSIS — Z71.2 ENCOUNTER TO DISCUSS TEST RESULTS: ICD-10-CM

## 2024-01-08 DIAGNOSIS — M19.90 ARTHRITIS: Primary | ICD-10-CM

## 2024-01-08 DIAGNOSIS — E78.2 HYPERLIPIDEMIA, MIXED: ICD-10-CM

## 2024-01-08 PROCEDURE — 99443 PR PHYS/QHP TELEPHONE EVALUATION 21-30 MIN: CPT | Performed by: FAMILY MEDICINE

## 2024-01-08 RX ORDER — MAGNESIUM 200 MG
1 TABLET ORAL DAILY
COMMUNITY
Start: 2023-09-15

## 2024-01-08 ASSESSMENT — PATIENT HEALTH QUESTIONNAIRE - PHQ9
SUM OF ALL RESPONSES TO PHQ9 QUESTIONS 1 AND 2: 0
2. FEELING DOWN, DEPRESSED OR HOPELESS: NOT AT ALL
1. LITTLE INTEREST OR PLEASURE IN DOING THINGS: NOT AT ALL

## 2024-01-08 ASSESSMENT — ENCOUNTER SYMPTOMS
LOSS OF SENSATION IN FEET: 1
OCCASIONAL FEELINGS OF UNSTEADINESS: 1
DEPRESSION: 0

## 2024-01-08 NOTE — ASSESSMENT & PLAN NOTE
Discussed lab results.  Encouraged trying rosuvastatin again but at a dose of once a week instead.

## 2024-01-08 NOTE — PROGRESS NOTES
Subjective   Chief complaint: Catherine L Bracht is a 69 y.o. female who presents for Results (Patient in office today to go over testing results. Patient advises that she may of had Covid about 2 days after Christina. Patient advises that she is feeling better. She went to test but couldn't figure out how to use the new test. ).    HPI:  Virtual visit by telephone.    Patient reports she understands her cholesterol level is high.  Had muscle pain with statin medication in the past.  Last tried 3 days a week of Rosuvastatin 10 mg. Also had low magnesium and is now on a supplement.  Has gotten all of her vaccines except she still needs the shingles vaccine.  She has chronic low back pain and would like to try home exercises instead of going out for physical therapy.          Objective   There were no vitals taken for this visit.    Physical Exam  Alert, no acute distress  Respirations nonlabored, no dyspnea  Mood: Positive and appropriate    Review of Systems   I have reviewed and reconciled the medication list with the patient today.   Current Outpatient Medications:     acetaminophen (Tylenol) 500 mg tablet, Take 2 tablets (1,000 mg) by mouth every 8 hours if needed for mild pain (1 - 3)., Disp: , Rfl:     aspirin 81 mg EC tablet, Take 1 tablet (81 mg) by mouth once daily., Disp: , Rfl:     carvedilol (Coreg) 25 mg tablet, Take 1 tablet (25 mg) by mouth 2 times a day with meals. 1 tab in the AM and 1/2 tab in the PM, Disp: , Rfl:     chlorthalidone (Hygroton) 25 mg tablet, Take 1 tablet (25 mg) by mouth once daily., Disp: , Rfl:     cholecalciferol (Vitamin D-3) 50 mcg (2,000 unit) capsule, Take 1 capsule (50 mcg) by mouth early in the morning.., Disp: , Rfl:     clopidogrel (Plavix) 75 mg tablet, Take 1 tablet (75 mg) by mouth once daily., Disp: , Rfl:     losartan (Cozaar) 50 mg tablet, Take 1 tablet (50 mg) by mouth once daily., Disp: , Rfl:     magnesium 200 mg tablet, Take 1 tablet (200 mg) by mouth once  daily., Disp: , Rfl:     meclizine (Antivert) 12.5 mg tablet, Take 2 tablets (25 mg) by mouth once daily.  TAKE 1-2 TABLETS DAILY AS DIRECTED., Disp: , Rfl:     nitroglycerin (Nitrostat) 0.4 mg SL tablet, Place 1 tablet (0.4 mg) under the tongue every 5 minutes if needed for chest pain.  Dissolve 1 tablet under the tongue as needed. If no pain relief call 911., Disp: , Rfl:     aluminum-magnesium hydroxide 200-200 mg/5 mL suspension, Take 5 mL by mouth once daily., Disp: , Rfl:     ezetimibe (Zetia) 10 mg tablet, Take 1 tablet (10 mg) by mouth once daily., Disp: , Rfl:     hydrocortisone (Anusol-HC) 2.5 % rectal cream, Insert 1 Application into the rectum once daily at bedtime., Disp: , Rfl:     rosuvastatin (Crestor) 5 mg tablet, Take 1 tablet (5 mg) by mouth.  TAKE 1 TABLET BY MOUTH 3 DAYS A WEEK AS TOLERATED, Disp: , Rfl:     tobramycin (Tobrex) 0.3 % ophthalmic solution, Administer 2 drops into affected eye(s) every 3 hours.  INSTILL 2 DROPS INTO AFFECTED EYE(S) EVERY 3 HOURS WHILE AWAKE FOR 7 DAYS, Disp: , Rfl:      Imaging:  No results found.     Labs reviewed:    Lab Results   Component Value Date    WBC 9.7 02/09/2023    HGB 14.3 02/09/2023    HCT 42.9 02/09/2023     02/09/2023    CHOL 231 (H) 12/18/2023    TRIG 387 (H) 12/18/2023    HDL 52.1 12/18/2023    ALT 20 02/09/2023    AST 21 02/09/2023     02/09/2023    K 3.6 02/09/2023    CL 96 (L) 02/09/2023    CREATININE 0.87 02/09/2023    BUN 19 02/09/2023    CO2 34 (H) 02/09/2023    TSH 2.50 02/09/2023       Assessment/Plan   Problem List Items Addressed This Visit       Arthritis - Primary     Discussed symptoms.  Patient education provided regarding arthritis in her back.         Encounter to discuss test results     Reviewed lab/testing results with the patient and provided patient education regarding the results.           Hyperlipidemia, mixed     Discussed lab results.  Encouraged trying rosuvastatin again but at a dose of once a week  instead.         Hypomagnesemia     Continue supplement as discussed.            Continue other medications as listed  Follow up in 4-6 months.  Sooner as needed.

## 2024-03-01 ENCOUNTER — OFFICE VISIT (OUTPATIENT)
Dept: PRIMARY CARE | Facility: CLINIC | Age: 69
End: 2024-03-01
Payer: COMMERCIAL

## 2024-03-01 VITALS
TEMPERATURE: 97.5 F | BODY MASS INDEX: 37.01 KG/M2 | SYSTOLIC BLOOD PRESSURE: 126 MMHG | HEART RATE: 85 BPM | HEIGHT: 62 IN | OXYGEN SATURATION: 96 % | WEIGHT: 201.13 LBS | DIASTOLIC BLOOD PRESSURE: 84 MMHG

## 2024-03-01 DIAGNOSIS — Z00.00 MEDICARE ANNUAL WELLNESS VISIT, SUBSEQUENT: Primary | ICD-10-CM

## 2024-03-01 DIAGNOSIS — E66.9 OBESITY (BMI 30-39.9): ICD-10-CM

## 2024-03-01 DIAGNOSIS — R73.01 IFG (IMPAIRED FASTING GLUCOSE): ICD-10-CM

## 2024-03-01 DIAGNOSIS — Z00.00 ROUTINE GENERAL MEDICAL EXAMINATION AT HEALTH CARE FACILITY: ICD-10-CM

## 2024-03-01 DIAGNOSIS — I10 PRIMARY HYPERTENSION: ICD-10-CM

## 2024-03-01 DIAGNOSIS — E78.2 HYPERLIPIDEMIA, MIXED: ICD-10-CM

## 2024-03-01 DIAGNOSIS — Z00.00 HEALTHCARE MAINTENANCE: ICD-10-CM

## 2024-03-01 PROBLEM — E11.65 TYPE 2 DIABETES MELLITUS WITH HYPERGLYCEMIA, WITHOUT LONG-TERM CURRENT USE OF INSULIN (MULTI): Status: ACTIVE | Noted: 2024-03-01

## 2024-03-01 LAB — POC HEMOGLOBIN A1C: 6.5 % (ref 4.2–6.5)

## 2024-03-01 PROCEDURE — 3008F BODY MASS INDEX DOCD: CPT | Performed by: FAMILY MEDICINE

## 2024-03-01 PROCEDURE — 1157F ADVNC CARE PLAN IN RCRD: CPT | Performed by: FAMILY MEDICINE

## 2024-03-01 PROCEDURE — 1159F MED LIST DOCD IN RCRD: CPT | Performed by: FAMILY MEDICINE

## 2024-03-01 PROCEDURE — 83036 HEMOGLOBIN GLYCOSYLATED A1C: CPT | Performed by: FAMILY MEDICINE

## 2024-03-01 PROCEDURE — G0439 PPPS, SUBSEQ VISIT: HCPCS | Performed by: FAMILY MEDICINE

## 2024-03-01 PROCEDURE — 1170F FXNL STATUS ASSESSED: CPT | Performed by: FAMILY MEDICINE

## 2024-03-01 PROCEDURE — 4010F ACE/ARB THERAPY RXD/TAKEN: CPT | Performed by: FAMILY MEDICINE

## 2024-03-01 PROCEDURE — 1160F RVW MEDS BY RX/DR IN RCRD: CPT | Performed by: FAMILY MEDICINE

## 2024-03-01 PROCEDURE — 3079F DIAST BP 80-89 MM HG: CPT | Performed by: FAMILY MEDICINE

## 2024-03-01 PROCEDURE — 1036F TOBACCO NON-USER: CPT | Performed by: FAMILY MEDICINE

## 2024-03-01 PROCEDURE — 3074F SYST BP LT 130 MM HG: CPT | Performed by: FAMILY MEDICINE

## 2024-03-01 ASSESSMENT — ACTIVITIES OF DAILY LIVING (ADL)
GROCERY_SHOPPING: INDEPENDENT
DOING_HOUSEWORK: INDEPENDENT
BATHING: INDEPENDENT
MANAGING_FINANCES: INDEPENDENT
DRESSING: INDEPENDENT
TAKING_MEDICATION: INDEPENDENT

## 2024-03-01 ASSESSMENT — ENCOUNTER SYMPTOMS
DEPRESSION: 0
OCCASIONAL FEELINGS OF UNSTEADINESS: 1
LOSS OF SENSATION IN FEET: 1

## 2024-03-01 ASSESSMENT — PATIENT HEALTH QUESTIONNAIRE - PHQ9
SUM OF ALL RESPONSES TO PHQ9 QUESTIONS 1 AND 2: 0
1. LITTLE INTEREST OR PLEASURE IN DOING THINGS: NOT AT ALL
2. FEELING DOWN, DEPRESSED OR HOPELESS: NOT AT ALL

## 2024-03-01 NOTE — PROGRESS NOTES
"Subjective :  Chief Complaint: Catherine L Bracht is an 69 y.o. female here for an annual wellness visit and general medical care and f/u.     HPI:  Here for an annual wellness visit.  Medications:  No concerns.  Sleep:  sleeping better since starting the Mg2+.  Takes a Tylenol PM every lorelei.  Diet:  discussed diet changes  Exercise:  body pains are better.  More active in spring and summer.  Mood:  good  Ophthalmology:  up to date, getting new glasses  Dental:  up to date  Has a change in her stools.  No longer diarrhea.  Had diarrhea for 20 years.  Started a MVI, elderberry and Magnesium.  Gummies.    Problem with lectin (tomatoes and beans).  That seemed to give her loose stools.    Her MVI has iron in it and it seems to help.  Plans to experiment with the supplements.    Has been starting to have more fruits, vegetables and fiber.  Also started to take \"Only What You Need\" supplement.  No sugars in it.  Has 5 gm fiber, 20 gm protein.  Skin and muscle pain is better.    Will make appt with ENT.  Has vertigo.  Last time she got her ears flushed it was painful.            Review of Systems  All systems reviewed and negative except for what was mentioned in the HPI    Objective   /84 (BP Location: Right arm, Patient Position: Sitting, BP Cuff Size: Adult)   Pulse 85   Temp 36.4 °C (97.5 °F) (Temporal)   Ht 1.575 m (5' 2\")   Wt 91.2 kg (201 lb 2 oz)   SpO2 96%   BMI 36.79 kg/m²     Physical Exam  General:  Alert, oriented, no acute distress  Eyes:  Sclerae white, PER, conjunctivae clear  ENT:  No nasal congestion.  TMs gray bilaterally.  Increased cerumen right ear canal.  Neck: Supple  Respiratory:  Normal breath sounds.  No wheezing, rhonchi nor crackles.  No dyspnea.  Cardiovascular:  S1 and S2 positive.  Regular rate and rhythm.  No gallops.  No murmurs.  Vascular:  No edema.  Skin warm and dry.  CNS:  No gross neurological deficits.  Gait within normal limits.    Psychiatric:  Affect is positive and " appropriate.  No depression.  No anxiety.    Imaging:  No results found.     Labs reviewed:    Lab Results   Component Value Date    WBC 9.7 02/09/2023    RBC 4.88 02/09/2023    HGB 14.3 02/09/2023    HCT 42.9 02/09/2023    MCV 88 02/09/2023     02/09/2023    CHOL 231 (H) 12/18/2023    TRIG 387 (H) 12/18/2023    HDL 52.1 12/18/2023    ALT 20 02/09/2023    AST 21 02/09/2023     02/09/2023    K 3.6 02/09/2023    CL 96 (L) 02/09/2023    CREATININE 0.87 02/09/2023    BUN 19 02/09/2023    CO2 34 (H) 02/09/2023    TSH 2.50 02/09/2023       Past Medical, Surgical, and Family History reviewed and updated in chart.    I have reviewed and reconciled the medication list with the patient today.   Current Outpatient Medications:     acetaminophen (Tylenol) 500 mg tablet, Take 2 tablets (1,000 mg) by mouth every 8 hours if needed for mild pain (1 - 3)., Disp: , Rfl:     aspirin 81 mg EC tablet, Take 1 tablet (81 mg) by mouth once daily., Disp: , Rfl:     carvedilol (Coreg) 25 mg tablet, Take 1 tablet (25 mg) by mouth 2 times a day with meals. 1 tab in the AM and 1/2 tab in the PM, Disp: , Rfl:     chlorthalidone (Hygroton) 25 mg tablet, Take 1 tablet (25 mg) by mouth once daily., Disp: , Rfl:     cholecalciferol (Vitamin D-3) 50 mcg (2,000 unit) capsule, Take 1 capsule (50 mcg) by mouth early in the morning.., Disp: , Rfl:     losartan (Cozaar) 50 mg tablet, Take 1 tablet (50 mg) by mouth once daily., Disp: , Rfl:     magnesium 200 mg tablet, Take 1 tablet (200 mg) by mouth once daily., Disp: , Rfl:     meclizine (Antivert) 12.5 mg tablet, Take 2 tablets (25 mg) by mouth once daily.  TAKE 1-2 TABLETS DAILY AS DIRECTED., Disp: , Rfl:     nitroglycerin (Nitrostat) 0.4 mg SL tablet, Place 1 tablet (0.4 mg) under the tongue every 5 minutes if needed for chest pain.  Dissolve 1 tablet under the tongue as needed. If no pain relief call 911., Disp: , Rfl:     vit C/Zn gluc/herbal no.325 (ELDERBERRY ZINC VIT C MM), Place 1  tablet into mouth between cheek and gum once daily., Disp: , Rfl:      List of current healthcare providers:  Patient Care Team:  Josette Mahmood MD as PCP - General  Josette Mahmood MD as PCP - [facility] P[facility]     Assessment/Plan :  Problem List Items Addressed This Visit       Hyperlipidemia, mixed     Monitoring on next blood test.         Relevant Orders    Comprehensive Metabolic Panel    Lipid Panel    Hypertension     Well-controlled.  Continue present management.         Relevant Orders    CBC and Auto Differential    TSH with reflex to Free T4 if abnormal    IFG (impaired fasting glucose)    Medicare annual wellness visit, subsequent - Primary     Age appropriate preventive measures reviewed and discussed.  Diet and exercise recommendations discussed.             Obesity (BMI 30-39.9)     Diet and exercise recommendations discussed.             Relevant Orders    Vitamin D 25-Hydroxy,Total (for eval of Vitamin D levels)     Other Visit Diagnoses       Healthcare maintenance        Relevant Orders    Hepatitis C Antibody    BMI 30.0-30.9,adult        Relevant Orders    Vitamin D 25-Hydroxy,Total (for eval of Vitamin D levels)    Routine general medical examination at health care facility              The following health maintenance schedule was reviewed with the patient and provided in printed form in the after visit summary:  Health Maintenance   Topic Date Due    Hepatitis C Screening  Never done    Diabetes Screening  Never done    Zoster Vaccines (1 of 2) Never done    Medicare Annual Wellness Visit (AWV)  03/18/2024    Mammogram  06/19/2024    Lipid Panel  12/18/2028    Colorectal Cancer Screening  03/16/2032    DTaP/Tdap/Td Vaccines (3 - Td or Tdap) 10/24/2033    Influenza Vaccine  Completed    Pneumococcal Vaccine: 65+ Years  Completed    Bone Density Scan  Completed    COVID-19 Vaccine  Completed    HIB Vaccines  Aged Out    Hepatitis B Vaccines  Aged Out     IPV Vaccines  Aged Out    Hepatitis A Vaccines  Aged Out    Meningococcal Vaccine  Aged Out    Rotavirus Vaccines  Aged Out    HPV Vaccines  Aged Out    Irritable Bowel Syndrome  Discontinued       Advance Care Planning   Has a living will and POA.        Reviewed lab/testing results with the patient and provided patient education regarding the results.  Continue current medications as listed  Follow up in 6 months.  Sooner as needed.

## 2024-04-02 ENCOUNTER — LAB (OUTPATIENT)
Dept: LAB | Facility: LAB | Age: 69
End: 2024-04-02
Payer: COMMERCIAL

## 2024-04-02 DIAGNOSIS — E66.9 OBESITY (BMI 30-39.9): ICD-10-CM

## 2024-04-02 DIAGNOSIS — E78.2 HYPERLIPIDEMIA, MIXED: ICD-10-CM

## 2024-04-02 DIAGNOSIS — Z00.00 HEALTHCARE MAINTENANCE: ICD-10-CM

## 2024-04-02 DIAGNOSIS — I10 PRIMARY HYPERTENSION: ICD-10-CM

## 2024-04-02 LAB
25(OH)D3 SERPL-MCNC: 33 NG/ML (ref 30–100)
ALBUMIN SERPL BCP-MCNC: 4.3 G/DL (ref 3.4–5)
ALP SERPL-CCNC: 52 U/L (ref 33–136)
ALT SERPL W P-5'-P-CCNC: 14 U/L (ref 7–45)
ANION GAP SERPL CALC-SCNC: 13 MMOL/L (ref 10–20)
AST SERPL W P-5'-P-CCNC: 14 U/L (ref 9–39)
BASOPHILS # BLD AUTO: 0.04 X10*3/UL (ref 0–0.1)
BASOPHILS NFR BLD AUTO: 0.4 %
BILIRUB SERPL-MCNC: 0.5 MG/DL (ref 0–1.2)
BUN SERPL-MCNC: 18 MG/DL (ref 6–23)
CALCIUM SERPL-MCNC: 10.5 MG/DL (ref 8.6–10.3)
CHLORIDE SERPL-SCNC: 100 MMOL/L (ref 98–107)
CHOLEST SERPL-MCNC: 230 MG/DL (ref 0–199)
CHOLESTEROL/HDL RATIO: 4.5
CO2 SERPL-SCNC: 34 MMOL/L (ref 21–32)
CREAT SERPL-MCNC: 0.67 MG/DL (ref 0.5–1.05)
EGFRCR SERPLBLD CKD-EPI 2021: >90 ML/MIN/1.73M*2
EOSINOPHIL # BLD AUTO: 0.33 X10*3/UL (ref 0–0.7)
EOSINOPHIL NFR BLD AUTO: 3.5 %
ERYTHROCYTE [DISTWIDTH] IN BLOOD BY AUTOMATED COUNT: 14.8 % (ref 11.5–14.5)
GLUCOSE SERPL-MCNC: 112 MG/DL (ref 74–99)
HCT VFR BLD AUTO: 43.2 % (ref 36–46)
HDLC SERPL-MCNC: 50.8 MG/DL
HGB BLD-MCNC: 13.9 G/DL (ref 12–16)
IMM GRANULOCYTES # BLD AUTO: 0.03 X10*3/UL (ref 0–0.7)
IMM GRANULOCYTES NFR BLD AUTO: 0.3 % (ref 0–0.9)
LDLC SERPL CALC-MCNC: ABNORMAL MG/DL
LYMPHOCYTES # BLD AUTO: 3.46 X10*3/UL (ref 1.2–4.8)
LYMPHOCYTES NFR BLD AUTO: 36.5 %
MCH RBC QN AUTO: 29.3 PG (ref 26–34)
MCHC RBC AUTO-ENTMCNC: 32.2 G/DL (ref 32–36)
MCV RBC AUTO: 91 FL (ref 80–100)
MONOCYTES # BLD AUTO: 0.73 X10*3/UL (ref 0.1–1)
MONOCYTES NFR BLD AUTO: 7.7 %
NEUTROPHILS # BLD AUTO: 4.88 X10*3/UL (ref 1.2–7.7)
NEUTROPHILS NFR BLD AUTO: 51.6 %
NON HDL CHOLESTEROL: 179 MG/DL (ref 0–149)
NRBC BLD-RTO: 0 /100 WBCS (ref 0–0)
PLATELET # BLD AUTO: 280 X10*3/UL (ref 150–450)
POTASSIUM SERPL-SCNC: 4.8 MMOL/L (ref 3.5–5.3)
PROT SERPL-MCNC: 7.2 G/DL (ref 6.4–8.2)
RBC # BLD AUTO: 4.74 X10*6/UL (ref 4–5.2)
SODIUM SERPL-SCNC: 142 MMOL/L (ref 136–145)
TRIGL SERPL-MCNC: 452 MG/DL (ref 0–149)
TSH SERPL-ACNC: 3.22 MIU/L (ref 0.44–3.98)
VLDL: ABNORMAL
WBC # BLD AUTO: 9.5 X10*3/UL (ref 4.4–11.3)

## 2024-04-02 PROCEDURE — 36415 COLL VENOUS BLD VENIPUNCTURE: CPT

## 2024-04-02 PROCEDURE — 82306 VITAMIN D 25 HYDROXY: CPT

## 2024-04-02 PROCEDURE — 86803 HEPATITIS C AB TEST: CPT

## 2024-04-02 PROCEDURE — 80053 COMPREHEN METABOLIC PANEL: CPT

## 2024-04-02 PROCEDURE — 80061 LIPID PANEL: CPT

## 2024-04-02 PROCEDURE — 85025 COMPLETE CBC W/AUTO DIFF WBC: CPT

## 2024-04-02 PROCEDURE — 84443 ASSAY THYROID STIM HORMONE: CPT

## 2024-04-03 LAB — HCV AB SER QL: NONREACTIVE

## 2024-04-20 DIAGNOSIS — I10 PRIMARY HYPERTENSION: ICD-10-CM

## 2024-04-22 RX ORDER — LOSARTAN POTASSIUM 50 MG/1
50 TABLET ORAL DAILY
Qty: 90 TABLET | Refills: 0 | Status: SHIPPED | OUTPATIENT
Start: 2024-04-22

## 2024-04-26 NOTE — PROGRESS NOTES
Subjective   Chief complaint: Catherine L Bracht is a 69 y.o. female who presents for Results (Patient is being called today to go over results. Patient advises that she would like to know what causes triglycerides to be elevated. Patient advises she doesn't drink Alcohol or do drugs. ) and Fall (Patient also advises that she had a new fall. Patient advises that she hurt her shoulder which is getting better but her hip is bothering her. ).    HPI:  Virtual or Telephone Consent    A telephone visit (audio only) between the patient (at the originating site) and the provider (at the distant site) was utilized to provide this telehealth service.   Verbal consent was requested and obtained from Catherine L Bracht on this date, 04/26/24 for a telehealth visit.   Recently had blood work done and would like to review those results together.    Discussed her recent lab results.    A1c was 6.5%.  She reports that when she was on turmeric her A1c was 5%.    Has some dizziness every day.  This is not new for her.    She reports she has a statin intolerance but is willing to try again with a very low and infrequent dose of a statin to see if she can slowly adjust to it.  Has been on Zetia in the past and is willing to restart this.  Continues on clopidogrel.  Has Factor V Leiden mutation.  No other new concerns today.          Objective   There were no vitals taken for this visit.  Physical Exam  Alert, pleasant, no acute distress  Respirations non-labored, no dyspnea  Mood is positive and appropriate.    Review of Systems   I have reviewed and reconciled the medication list with the patient today.   Current Outpatient Medications:     acetaminophen (Tylenol) 500 mg tablet, Take 2 tablets (1,000 mg) by mouth every 8 hours if needed for mild pain (1 - 3)., Disp: , Rfl:     aspirin 81 mg EC tablet, Take 1 tablet (81 mg) by mouth once daily., Disp: , Rfl:     carvedilol (Coreg) 25 mg tablet, Take 1 tablet (25 mg) by mouth. 1 tab in  the AM and 1/2 tab in the PM, Disp: , Rfl:     chlorthalidone (Hygroton) 25 mg tablet, Take 1 tablet (25 mg) by mouth once daily., Disp: , Rfl:     cholecalciferol (Vitamin D-3) 50 mcg (2,000 unit) capsule, Take 1 capsule (50 mcg) by mouth early in the morning.., Disp: , Rfl:     clopidogrel (Plavix) 75 mg tablet, Take 1 tablet (75 mg) by mouth once daily., Disp: , Rfl:     losartan (Cozaar) 50 mg tablet, Take 1 tablet by mouth once daily, Disp: 90 tablet, Rfl: 0    magnesium 200 mg tablet, Take 1 tablet (200 mg) by mouth once daily., Disp: , Rfl:     multivitamin tablet, Take 1 tablet by mouth once daily., Disp: , Rfl:     nitroglycerin (Nitrostat) 0.4 mg SL tablet, Place 1 tablet (0.4 mg) under the tongue every 5 minutes if needed for chest pain.  Dissolve 1 tablet under the tongue as needed. If no pain relief call 911., Disp: , Rfl:     vit C/Zn gluc/herbal no.325 (ELDERBERRY ZINC VIT C MM), Place 1 tablet into mouth between cheek and gum once daily., Disp: , Rfl:     ezetimibe (Zetia) 10 mg tablet, Take 1 tablet (10 mg) by mouth once daily., Disp: 30 tablet, Rfl: 5    meclizine (Antivert) 12.5 mg tablet, Take 2 tablets (25 mg) by mouth once daily.  TAKE 1-2 TABLETS DAILY AS DIRECTED., Disp: , Rfl:     rosuvastatin (Crestor) 5 mg tablet, Take one tablet by mouth once a week for a month, then increase to two days per week., Disp: 30 tablet, Rfl: 3     Imaging:  No results found.     Labs reviewed:    Lab Results   Component Value Date    WBC 9.5 04/02/2024    HGB 13.9 04/02/2024    HCT 43.2 04/02/2024     04/02/2024    CHOL 230 (H) 04/02/2024    TRIG 452 (H) 04/02/2024    HDL 50.8 04/02/2024    ALT 14 04/02/2024    AST 14 04/02/2024     04/02/2024    K 4.8 04/02/2024     04/02/2024    CREATININE 0.67 04/02/2024    BUN 18 04/02/2024    CO2 34 (H) 04/02/2024    TSH 3.22 04/02/2024    HGBA1C 6.5 03/01/2024       Assessment/Plan   Problem List Items Addressed This Visit       Encounter to discuss  test results     Reviewed lab/testing results with the patient and provided patient education regarding the results.           Factor V Leiden mutation (Multi)    Relevant Medications    clopidogrel (Plavix) 75 mg tablet    Hyperlipidemia, mixed - Primary     Restarting a very low and infrequent dose of rosuvastatin.    Past study by the [facility] reported that intolerance may be able to be changed with a low and infrequent dosing of the statin.    5 mg once a week.  She will try again and see if she can slowly tolerate it.  She is to let us know how she feels on this.           Relevant Medications    rosuvastatin (Crestor) 5 mg tablet    ezetimibe (Zetia) 10 mg tablet    Type 2 diabetes mellitus with hyperglycemia, without long-term current use of insulin (Multi)       Continue current medications as listed  Follow up in 3 months.  Time Spent  Prep time on day of patient encounter: 4 minutes  Time spent directly with patient, family or caregiver: 21 minutes  Documentation Time: 8 minutes

## 2024-04-29 ENCOUNTER — TELEMEDICINE (OUTPATIENT)
Dept: PRIMARY CARE | Facility: CLINIC | Age: 69
End: 2024-04-29
Payer: COMMERCIAL

## 2024-04-29 DIAGNOSIS — Z71.2 ENCOUNTER TO DISCUSS TEST RESULTS: ICD-10-CM

## 2024-04-29 DIAGNOSIS — E78.2 HYPERLIPIDEMIA, MIXED: Primary | ICD-10-CM

## 2024-04-29 DIAGNOSIS — E11.65 TYPE 2 DIABETES MELLITUS WITH HYPERGLYCEMIA, WITHOUT LONG-TERM CURRENT USE OF INSULIN (MULTI): ICD-10-CM

## 2024-04-29 DIAGNOSIS — D68.51 FACTOR V LEIDEN MUTATION (MULTI): ICD-10-CM

## 2024-04-29 PROCEDURE — 1157F ADVNC CARE PLAN IN RCRD: CPT | Performed by: FAMILY MEDICINE

## 2024-04-29 PROCEDURE — 1160F RVW MEDS BY RX/DR IN RCRD: CPT | Performed by: FAMILY MEDICINE

## 2024-04-29 PROCEDURE — 99443 PR PHYS/QHP TELEPHONE EVALUATION 21-30 MIN: CPT | Performed by: FAMILY MEDICINE

## 2024-04-29 PROCEDURE — 1123F ACP DISCUSS/DSCN MKR DOCD: CPT | Performed by: FAMILY MEDICINE

## 2024-04-29 PROCEDURE — 4010F ACE/ARB THERAPY RXD/TAKEN: CPT | Performed by: FAMILY MEDICINE

## 2024-04-29 PROCEDURE — 1036F TOBACCO NON-USER: CPT | Performed by: FAMILY MEDICINE

## 2024-04-29 PROCEDURE — 1159F MED LIST DOCD IN RCRD: CPT | Performed by: FAMILY MEDICINE

## 2024-04-29 PROCEDURE — 1158F ADVNC CARE PLAN TLK DOCD: CPT | Performed by: FAMILY MEDICINE

## 2024-04-29 PROCEDURE — 3008F BODY MASS INDEX DOCD: CPT | Performed by: FAMILY MEDICINE

## 2024-04-29 RX ORDER — EZETIMIBE 10 MG/1
10 TABLET ORAL DAILY
Qty: 30 TABLET | Refills: 5 | Status: SHIPPED | OUTPATIENT
Start: 2024-04-29 | End: 2024-10-26

## 2024-04-29 RX ORDER — BISMUTH SUBSALICYLATE 262 MG
1 TABLET,CHEWABLE ORAL DAILY
COMMUNITY

## 2024-04-29 RX ORDER — ROSUVASTATIN CALCIUM 5 MG/1
TABLET, COATED ORAL
Qty: 30 TABLET | Refills: 3 | Status: SHIPPED | OUTPATIENT
Start: 2024-04-29

## 2024-04-29 RX ORDER — CLOPIDOGREL BISULFATE 75 MG/1
75 TABLET ORAL DAILY
COMMUNITY
Start: 2024-03-07

## 2024-04-29 ASSESSMENT — ENCOUNTER SYMPTOMS
OCCASIONAL FEELINGS OF UNSTEADINESS: 1
DEPRESSION: 0
LOSS OF SENSATION IN FEET: 1

## 2024-04-29 ASSESSMENT — PATIENT HEALTH QUESTIONNAIRE - PHQ9
2. FEELING DOWN, DEPRESSED OR HOPELESS: NOT AT ALL
1. LITTLE INTEREST OR PLEASURE IN DOING THINGS: NOT AT ALL
SUM OF ALL RESPONSES TO PHQ9 QUESTIONS 1 AND 2: 0

## 2024-04-29 NOTE — ASSESSMENT & PLAN NOTE
Restarting a very low and infrequent dose of rosuvastatin.    Past study by the [facility] reported that intolerance may be able to be changed with a low and infrequent dosing of the statin.    5 mg once a week.  She will try again and see if she can slowly tolerate it.  She is to let us know how she feels on this.

## 2024-06-04 ENCOUNTER — OFFICE VISIT (OUTPATIENT)
Dept: CARDIOLOGY CLINIC | Age: 69
End: 2024-06-04
Payer: COMMERCIAL

## 2024-06-04 VITALS
WEIGHT: 199 LBS | DIASTOLIC BLOOD PRESSURE: 82 MMHG | SYSTOLIC BLOOD PRESSURE: 128 MMHG | HEART RATE: 80 BPM | HEIGHT: 62 IN | BODY MASS INDEX: 36.62 KG/M2

## 2024-06-04 DIAGNOSIS — E66.9 OBESITY (BMI 30-39.9): ICD-10-CM

## 2024-06-04 DIAGNOSIS — E78.2 MIXED HYPERLIPIDEMIA: ICD-10-CM

## 2024-06-04 DIAGNOSIS — I10 ESSENTIAL HYPERTENSION: ICD-10-CM

## 2024-06-04 DIAGNOSIS — I25.10 CORONARY ARTERY DISEASE INVOLVING NATIVE CORONARY ARTERY OF NATIVE HEART WITHOUT ANGINA PECTORIS: Primary | ICD-10-CM

## 2024-06-04 DIAGNOSIS — I73.9 PAD (PERIPHERAL ARTERY DISEASE) (HCC): ICD-10-CM

## 2024-06-04 DIAGNOSIS — Z78.9 STATIN INTOLERANCE: ICD-10-CM

## 2024-06-04 DIAGNOSIS — I25.10 CHRONIC CORONARY ARTERY DISEASE: ICD-10-CM

## 2024-06-04 DIAGNOSIS — I10 PRIMARY HYPERTENSION: ICD-10-CM

## 2024-06-04 PROCEDURE — 93000 ELECTROCARDIOGRAM COMPLETE: CPT | Performed by: INTERNAL MEDICINE

## 2024-06-04 PROCEDURE — 3074F SYST BP LT 130 MM HG: CPT | Performed by: INTERNAL MEDICINE

## 2024-06-04 PROCEDURE — 1123F ACP DISCUSS/DSCN MKR DOCD: CPT | Performed by: INTERNAL MEDICINE

## 2024-06-04 PROCEDURE — 99214 OFFICE O/P EST MOD 30 MIN: CPT | Performed by: INTERNAL MEDICINE

## 2024-06-04 PROCEDURE — 3079F DIAST BP 80-89 MM HG: CPT | Performed by: INTERNAL MEDICINE

## 2024-06-04 RX ORDER — NITROGLYCERIN 0.4 MG/1
TABLET SUBLINGUAL
Qty: 25 TABLET | Refills: 3 | Status: SHIPPED | OUTPATIENT
Start: 2024-06-04

## 2024-06-04 NOTE — PROGRESS NOTES
Chief Complaint   Patient presents with    Coronary Artery Disease    Hypertension    Hyperlipidemia       Patient presents for initial medical evaluation. Patient is followed on a regular basis by Gris Villa MD. Hx of CAD s/p PCI in 2002 at [facility]. Has not seen a cardiologist for 5 yrs. NO recent stress test. States she had lower anterior rib pain the other day, does have some atypical CP here and there per patient. No smoking. She does have SOB with moderate exertion. Does have LE edema as well. compliant with meds. Pt denies  dyspnea,  change in exercise capacity, fatigue,  nausea, vomiting, diarrhea, constipation, motor weakness, insomnia, weight loss, syncope, dizziness, lightheadedness, palpitations, PND, orthopnea. States she has b/l leg pain all over. States she has bad knees. Does have a hx of statin intolerance. Has tried lipitor, zocor and Pracachol per patient.   Does have a hx of DVT and factor V leiden and is on OAC for life.     5-30-17:states her symptoms have resolved. Does have SOB real bad she states however.  Pt denies chest pain, , change in exercise capacity, fatigue,  nausea, vomiting, diarrhea, constipation, motor weakness, insomnia, weight loss, syncope, dizziness, lightheadedness, palpitations, PND, orthopnea, or claudication.  No nitro use. BP and hr are good. CAD is stable. No LE discoloration or ulcers. No LE edema. No CHF type symptoms. Lipid profile is normal. Tolerating livalo ok so far. States she is tired and would like to get off inderal.     S/p DSE:     RESULTS: Resting EKG revealed heart rate of 81 beats per minute. No  diagnostic ST-T wave changes were seen. Frequent PVCs were noted. At peak  infusion of IV dobutamine. Blood pressure response was hypertensive. Maximum  systolic blood pressure 236 mmHg, heart rate response was blunted. Maximum  heart rate was 107 beats per minute. This may have been due to beta blocker  therapy.      RESULTS: Resting cardiogram showed

## 2024-07-14 DIAGNOSIS — I10 PRIMARY HYPERTENSION: ICD-10-CM

## 2024-07-17 RX ORDER — LOSARTAN POTASSIUM 50 MG/1
50 TABLET ORAL DAILY
Qty: 90 TABLET | Refills: 0 | Status: SHIPPED | OUTPATIENT
Start: 2024-07-17

## 2024-07-29 DIAGNOSIS — Z12.31 ENCOUNTER FOR SCREENING MAMMOGRAM FOR BREAST CANCER: ICD-10-CM

## 2024-08-01 ENCOUNTER — HOSPITAL ENCOUNTER (OUTPATIENT)
Dept: RADIOLOGY | Facility: HOSPITAL | Age: 69
Discharge: HOME | End: 2024-08-01
Payer: COMMERCIAL

## 2024-08-01 VITALS — WEIGHT: 200 LBS | BODY MASS INDEX: 36.8 KG/M2 | HEIGHT: 62 IN

## 2024-08-01 DIAGNOSIS — Z12.31 ENCOUNTER FOR SCREENING MAMMOGRAM FOR BREAST CANCER: ICD-10-CM

## 2024-08-01 PROCEDURE — 77067 SCR MAMMO BI INCL CAD: CPT | Performed by: RADIOLOGY

## 2024-08-01 PROCEDURE — 77063 BREAST TOMOSYNTHESIS BI: CPT | Performed by: RADIOLOGY

## 2024-08-01 PROCEDURE — 77067 SCR MAMMO BI INCL CAD: CPT

## 2024-08-02 NOTE — RESULT ENCOUNTER NOTE
Please let patient know her mammogram is normal and her next mamm appointment should be in one year.

## 2024-08-23 DIAGNOSIS — I10 ESSENTIAL HYPERTENSION: ICD-10-CM

## 2024-08-23 RX ORDER — CHLORTHALIDONE 25 MG/1
TABLET ORAL
Qty: 90 TABLET | Refills: 3 | Status: SHIPPED | OUTPATIENT
Start: 2024-08-23

## 2024-08-23 NOTE — TELEPHONE ENCOUNTER
Requesting medication refill. Please approve or deny this request.    Rx requested:  Requested Prescriptions     Pending Prescriptions Disp Refills    chlorthalidone (HYGROTON) 25 MG tablet [Pharmacy Med Name: Chlorthalidone 25 MG Oral Tablet] 90 tablet 0     Sig: Take 1 tablet by mouth once daily         Last Office Visit:   6/4/2024      Next Visit Date:  Future Appointments   Date Time Provider Department Center   6/10/2025  9:00 AM Holiday, DO [facility]               Last refill 08/25/2023. Please approve or deny.

## 2024-09-09 ENCOUNTER — APPOINTMENT (OUTPATIENT)
Dept: PRIMARY CARE | Facility: CLINIC | Age: 69
End: 2024-09-09
Payer: COMMERCIAL

## 2024-09-09 VITALS
HEART RATE: 77 BPM | SYSTOLIC BLOOD PRESSURE: 120 MMHG | DIASTOLIC BLOOD PRESSURE: 80 MMHG | TEMPERATURE: 98 F | WEIGHT: 202.2 LBS | HEIGHT: 62 IN | BODY MASS INDEX: 37.21 KG/M2 | OXYGEN SATURATION: 97 %

## 2024-09-09 DIAGNOSIS — R06.83 LOUD SNORING: ICD-10-CM

## 2024-09-09 DIAGNOSIS — E83.52 SERUM CALCIUM ELEVATED: ICD-10-CM

## 2024-09-09 DIAGNOSIS — E66.9 OBESITY (BMI 30-39.9): ICD-10-CM

## 2024-09-09 DIAGNOSIS — E78.2 HYPERLIPIDEMIA, MIXED: ICD-10-CM

## 2024-09-09 DIAGNOSIS — G47.00 INSOMNIA, UNSPECIFIED TYPE: ICD-10-CM

## 2024-09-09 DIAGNOSIS — I10 PRIMARY HYPERTENSION: ICD-10-CM

## 2024-09-09 DIAGNOSIS — E11.65 TYPE 2 DIABETES MELLITUS WITH HYPERGLYCEMIA, WITHOUT LONG-TERM CURRENT USE OF INSULIN (MULTI): Primary | ICD-10-CM

## 2024-09-09 DIAGNOSIS — H61.23 BILATERAL IMPACTED CERUMEN: ICD-10-CM

## 2024-09-09 LAB — POC HEMOGLOBIN A1C: 6.2 % (ref 4.2–6.5)

## 2024-09-09 PROCEDURE — 3008F BODY MASS INDEX DOCD: CPT | Performed by: FAMILY MEDICINE

## 2024-09-09 PROCEDURE — 1157F ADVNC CARE PLAN IN RCRD: CPT | Performed by: FAMILY MEDICINE

## 2024-09-09 PROCEDURE — 1158F ADVNC CARE PLAN TLK DOCD: CPT | Performed by: FAMILY MEDICINE

## 2024-09-09 PROCEDURE — 1159F MED LIST DOCD IN RCRD: CPT | Performed by: FAMILY MEDICINE

## 2024-09-09 PROCEDURE — 99214 OFFICE O/P EST MOD 30 MIN: CPT | Performed by: FAMILY MEDICINE

## 2024-09-09 PROCEDURE — 83036 HEMOGLOBIN GLYCOSYLATED A1C: CPT | Performed by: FAMILY MEDICINE

## 2024-09-09 PROCEDURE — 3074F SYST BP LT 130 MM HG: CPT | Performed by: FAMILY MEDICINE

## 2024-09-09 PROCEDURE — 1123F ACP DISCUSS/DSCN MKR DOCD: CPT | Performed by: FAMILY MEDICINE

## 2024-09-09 PROCEDURE — 3079F DIAST BP 80-89 MM HG: CPT | Performed by: FAMILY MEDICINE

## 2024-09-09 PROCEDURE — 4010F ACE/ARB THERAPY RXD/TAKEN: CPT | Performed by: FAMILY MEDICINE

## 2024-09-09 PROCEDURE — 1036F TOBACCO NON-USER: CPT | Performed by: FAMILY MEDICINE

## 2024-09-09 ASSESSMENT — PATIENT HEALTH QUESTIONNAIRE - PHQ9
2. FEELING DOWN, DEPRESSED OR HOPELESS: NOT AT ALL
SUM OF ALL RESPONSES TO PHQ9 QUESTIONS 1 AND 2: 0
1. LITTLE INTEREST OR PLEASURE IN DOING THINGS: NOT AT ALL

## 2024-09-09 ASSESSMENT — ENCOUNTER SYMPTOMS
LOSS OF SENSATION IN FEET: 1
OCCASIONAL FEELINGS OF UNSTEADINESS: 1
DEPRESSION: 0

## 2024-09-09 NOTE — PROGRESS NOTES
"Subjective   Chief complaint: Catherine L Bracht is a 69 y.o. female who presents for Follow-up (Patient is in office today for a 6 month follow-up).    HPI:  Here for a follow up appointment.    No concerns with her medicines.  Went off her rosuvastatin and decreased her Zetia to 1-2 times per week.  Had felt muscle pain.    Now on Fiber Advance.    Had a cough and cardiology told her to stop the Losartan.  Cough improved off it.  Went camping.  She doesn't sleep well and snores loudly.    Would like her ears checked.  Has some decreased hearing.    Fell in April 2024.  Had been bending over then nitin up too quickly.  Vertigo made her fall back.          Objective   /80 (BP Location: Right arm, Patient Position: Sitting, BP Cuff Size: Large adult)   Pulse 77   Temp 36.7 °C (98 °F) (Temporal)   Ht 1.575 m (5' 2.01\")   Wt 91.7 kg (202 lb 3.2 oz)   SpO2 97% Comment: RA  BMI 36.97 kg/m²   Physical Exam  General:  Alert, oriented, no acute distress  Eyes:  Sclerae white, PER, conjunctivae clear  ENT:  No nasal congestion.    Neck: Supple  Endocrine:  No thyromegaly. No thyroid nodes.   Respiratory:  Normal breath sounds.  No wheezing, rhonchi nor crackles.  No dyspnea.  Cardiovascular:  S1 and S2 positive.  Regular rate and rhythm.  No gallops.  No murmurs.  Vascular:  No edema.  Skin warm and dry.  CNS:  No gross neurological deficits.  Gait within normal limits.    Psychiatric:  Affect is positive and appropriate.  No depression.  No anxiety.    Review of Systems   I have reviewed and reconciled the medication list with the patient today.   Current Outpatient Medications:     acetaminophen (Tylenol) 500 mg tablet, Take 2 tablets (1,000 mg) by mouth every 8 hours if needed for mild pain (1 - 3)., Disp: , Rfl:     aspirin 81 mg EC tablet, Take 1 tablet (81 mg) by mouth once daily., Disp: , Rfl:     carvedilol (Coreg) 25 mg tablet, Take 2 tablets (50 mg) by mouth. Takes 1 in AM and 1 in PM, Disp: , Rfl:     " chlorthalidone (Hygroton) 25 mg tablet, Take 1 tablet (25 mg) by mouth once daily., Disp: , Rfl:     cholecalciferol (Vitamin D-3) 50 mcg (2,000 unit) capsule, Take 1 capsule (50 mcg) by mouth early in the morning.., Disp: , Rfl:     clopidogrel (Plavix) 75 mg tablet, Take 1 tablet (75 mg) by mouth once daily., Disp: , Rfl:     inulin (FIBER GUMMIES ORAL), Take 1 tablet by mouth 3 times a day., Disp: , Rfl:     magnesium 250 mg tablet, Take 1 tablet (250 mg) by mouth once daily., Disp: , Rfl:     nitroglycerin (Nitrostat) 0.4 mg SL tablet, Place 1 tablet (0.4 mg) under the tongue every 5 minutes if needed for chest pain.  Dissolve 1 tablet under the tongue as needed. If no pain relief call 911., Disp: , Rfl:     vit C/Zn gluc/herbal no.325 (ELDERBERRY ZINC VIT C MM), Place 1 tablet into mouth between cheek and gum once daily., Disp: , Rfl:     ezetimibe (Zetia) 10 mg tablet, Take 1 tablet (10 mg) by mouth once daily. (Patient not taking: Reported on 9/9/2024), Disp: 30 tablet, Rfl: 5    losartan (Cozaar) 50 mg tablet, Take 1 tablet by mouth once daily (Patient not taking: Reported on 9/9/2024), Disp: 90 tablet, Rfl: 0    meclizine (Antivert) 12.5 mg tablet, Take 2 tablets (25 mg) by mouth once daily.  TAKE 1-2 TABLETS DAILY AS DIRECTED., Disp: , Rfl:     multivitamin tablet, Take 1 tablet by mouth once daily., Disp: , Rfl:      Imaging:  No results found.     Labs reviewed:    Lab Results   Component Value Date    WBC 9.5 04/02/2024    HGB 13.9 04/02/2024    HCT 43.2 04/02/2024     04/02/2024    CHOL 230 (H) 04/02/2024    TRIG 452 (H) 04/02/2024    HDL 50.8 04/02/2024    ALT 14 04/02/2024    AST 14 04/02/2024     04/02/2024    K 4.8 04/02/2024     04/02/2024    CREATININE 0.67 04/02/2024    BUN 18 04/02/2024    CO2 34 (H) 04/02/2024    TSH 3.22 04/02/2024    HGBA1C 6.5 03/01/2024       Assessment/Plan   Problem List Items Addressed This Visit       Bilateral impacted cerumen     Does not want her  ears flushed today.  Discussed OTC cerumen softening drops.         Hyperlipidemia, mixed     Lab order on chart.         Relevant Orders    Comprehensive Metabolic Panel    Lipid Panel    Hypertension     Doing fine off of the Losartan.  Will continue to monitor.         Relevant Orders    CBC and Auto Differential    TSH with reflex to Free T4 if abnormal    Insomnia     Sleep study ordered.         Relevant Orders    Home sleep apnea test (HSAT)    Loud snoring     Sleep study ordered.  Patient would like an in-home study.         Obesity (BMI 30-39.9)    Relevant Orders    Vitamin D 25-Hydroxy,Total (for eval of Vitamin D levels)    Serum calcium elevated     Will check PTH on next lab test.         Relevant Orders    PTH, intact    Type 2 diabetes mellitus with hyperglycemia, without long-term current use of insulin (Multi) - Primary     HgbA1c today.         Relevant Orders    POCT glycosylated hemoglobin (Hb A1C) manually resulted    Comprehensive Metabolic Panel    Albumin-Creatinine Ratio, Urine Random     Other Visit Diagnoses       BMI 36.0-36.9,adult        Relevant Orders    Vitamin D 25-Hydroxy,Total (for eval of Vitamin D levels)            Continue current medications as listed  Follow up in 3-6 months.   Patient was identified as a fall risk. Risk prevention instructions provided.

## 2024-10-04 ENCOUNTER — LAB (OUTPATIENT)
Dept: LAB | Facility: LAB | Age: 69
End: 2024-10-04
Payer: COMMERCIAL

## 2024-10-04 DIAGNOSIS — E78.2 HYPERLIPIDEMIA, MIXED: ICD-10-CM

## 2024-10-04 DIAGNOSIS — I10 PRIMARY HYPERTENSION: ICD-10-CM

## 2024-10-04 DIAGNOSIS — E66.9 OBESITY (BMI 30-39.9): ICD-10-CM

## 2024-10-04 DIAGNOSIS — E83.52 SERUM CALCIUM ELEVATED: ICD-10-CM

## 2024-10-04 DIAGNOSIS — E11.65 TYPE 2 DIABETES MELLITUS WITH HYPERGLYCEMIA, WITHOUT LONG-TERM CURRENT USE OF INSULIN: ICD-10-CM

## 2024-10-04 LAB
25(OH)D3 SERPL-MCNC: 31 NG/ML (ref 30–100)
ALBUMIN SERPL BCP-MCNC: 4.3 G/DL (ref 3.4–5)
ALP SERPL-CCNC: 47 U/L (ref 33–136)
ALT SERPL W P-5'-P-CCNC: 13 U/L (ref 7–45)
ANION GAP SERPL CALC-SCNC: 13 MMOL/L (ref 10–20)
AST SERPL W P-5'-P-CCNC: 13 U/L (ref 9–39)
BASOPHILS # BLD AUTO: 0.04 X10*3/UL (ref 0–0.1)
BASOPHILS NFR BLD AUTO: 0.5 %
BILIRUB SERPL-MCNC: 0.4 MG/DL (ref 0–1.2)
BUN SERPL-MCNC: 19 MG/DL (ref 6–23)
CALCIUM SERPL-MCNC: 10.3 MG/DL (ref 8.6–10.3)
CHLORIDE SERPL-SCNC: 99 MMOL/L (ref 98–107)
CHOLEST SERPL-MCNC: 217 MG/DL (ref 0–199)
CHOLESTEROL/HDL RATIO: 4.5
CO2 SERPL-SCNC: 32 MMOL/L (ref 21–32)
CREAT SERPL-MCNC: 0.77 MG/DL (ref 0.5–1.05)
CREAT UR-MCNC: 209.1 MG/DL (ref 20–320)
EGFRCR SERPLBLD CKD-EPI 2021: 83 ML/MIN/1.73M*2
EOSINOPHIL # BLD AUTO: 0.38 X10*3/UL (ref 0–0.7)
EOSINOPHIL NFR BLD AUTO: 4.7 %
ERYTHROCYTE [DISTWIDTH] IN BLOOD BY AUTOMATED COUNT: 14.8 % (ref 11.5–14.5)
GLUCOSE SERPL-MCNC: 142 MG/DL (ref 74–99)
HCT VFR BLD AUTO: 42.3 % (ref 36–46)
HDLC SERPL-MCNC: 47.9 MG/DL
HGB BLD-MCNC: 13.8 G/DL (ref 12–16)
IMM GRANULOCYTES # BLD AUTO: 0.02 X10*3/UL (ref 0–0.7)
IMM GRANULOCYTES NFR BLD AUTO: 0.2 % (ref 0–0.9)
LDLC SERPL CALC-MCNC: 94 MG/DL
LYMPHOCYTES # BLD AUTO: 3.81 X10*3/UL (ref 1.2–4.8)
LYMPHOCYTES NFR BLD AUTO: 47.5 %
MCH RBC QN AUTO: 29.1 PG (ref 26–34)
MCHC RBC AUTO-ENTMCNC: 32.6 G/DL (ref 32–36)
MCV RBC AUTO: 89 FL (ref 80–100)
MICROALBUMIN UR-MCNC: 43.7 MG/L
MICROALBUMIN/CREAT UR: 20.9 UG/MG CREAT
MONOCYTES # BLD AUTO: 0.75 X10*3/UL (ref 0.1–1)
MONOCYTES NFR BLD AUTO: 9.4 %
NEUTROPHILS # BLD AUTO: 3.02 X10*3/UL (ref 1.2–7.7)
NEUTROPHILS NFR BLD AUTO: 37.7 %
NON HDL CHOLESTEROL: 169 MG/DL (ref 0–149)
NRBC BLD-RTO: 0 /100 WBCS (ref 0–0)
PLATELET # BLD AUTO: 272 X10*3/UL (ref 150–450)
POTASSIUM SERPL-SCNC: 4.4 MMOL/L (ref 3.5–5.3)
PROT SERPL-MCNC: 7 G/DL (ref 6.4–8.2)
PTH-INTACT SERPL-MCNC: 72.1 PG/ML (ref 18.5–88)
RBC # BLD AUTO: 4.74 X10*6/UL (ref 4–5.2)
SODIUM SERPL-SCNC: 140 MMOL/L (ref 136–145)
TRIGL SERPL-MCNC: 374 MG/DL (ref 0–149)
TSH SERPL-ACNC: 2.96 MIU/L (ref 0.44–3.98)
VLDL: 75 MG/DL (ref 0–40)
WBC # BLD AUTO: 8 X10*3/UL (ref 4.4–11.3)

## 2024-10-04 PROCEDURE — 80053 COMPREHEN METABOLIC PANEL: CPT

## 2024-10-04 PROCEDURE — 82043 UR ALBUMIN QUANTITATIVE: CPT

## 2024-10-04 PROCEDURE — 36415 COLL VENOUS BLD VENIPUNCTURE: CPT

## 2024-10-04 PROCEDURE — 82570 ASSAY OF URINE CREATININE: CPT

## 2024-10-04 PROCEDURE — 84443 ASSAY THYROID STIM HORMONE: CPT

## 2024-10-04 PROCEDURE — 82306 VITAMIN D 25 HYDROXY: CPT

## 2024-10-04 PROCEDURE — 83970 ASSAY OF PARATHORMONE: CPT

## 2024-10-04 PROCEDURE — 80061 LIPID PANEL: CPT

## 2024-10-04 PROCEDURE — 85025 COMPLETE CBC W/AUTO DIFF WBC: CPT

## 2024-10-22 ENCOUNTER — APPOINTMENT (OUTPATIENT)
Dept: PRIMARY CARE | Facility: CLINIC | Age: 69
End: 2024-10-22
Payer: COMMERCIAL

## 2024-10-22 DIAGNOSIS — G47.30 SLEEP APNEA, UNSPECIFIED TYPE: Primary | ICD-10-CM

## 2024-10-22 DIAGNOSIS — I25.118 ATHEROSCLEROSIS OF NATIVE CORONARY ARTERY OF NATIVE HEART WITH STABLE ANGINA PECTORIS: ICD-10-CM

## 2024-10-22 DIAGNOSIS — I10 PRIMARY HYPERTENSION: ICD-10-CM

## 2024-10-22 DIAGNOSIS — R05.9 COUGH IN ADULT: ICD-10-CM

## 2024-10-22 DIAGNOSIS — R06.83 LOUD SNORING: ICD-10-CM

## 2024-10-22 DIAGNOSIS — Z71.2 ENCOUNTER TO DISCUSS TEST RESULTS: ICD-10-CM

## 2024-10-22 DIAGNOSIS — E78.2 HYPERLIPIDEMIA, MIXED: ICD-10-CM

## 2024-10-22 PROBLEM — H10.33 ACUTE BACTERIAL CONJUNCTIVITIS OF BOTH EYES: Status: RESOLVED | Noted: 2023-12-19 | Resolved: 2024-10-22

## 2024-10-22 PROBLEM — J01.90 ACUTE SINUS INFECTION: Status: RESOLVED | Noted: 2023-12-19 | Resolved: 2024-10-22

## 2024-10-22 PROCEDURE — 1123F ACP DISCUSS/DSCN MKR DOCD: CPT | Performed by: FAMILY MEDICINE

## 2024-10-22 PROCEDURE — 1157F ADVNC CARE PLAN IN RCRD: CPT | Performed by: FAMILY MEDICINE

## 2024-10-22 PROCEDURE — 3060F POS MICROALBUMINURIA REV: CPT | Performed by: FAMILY MEDICINE

## 2024-10-22 PROCEDURE — 1036F TOBACCO NON-USER: CPT | Performed by: FAMILY MEDICINE

## 2024-10-22 PROCEDURE — 99443 PR PHYS/QHP TELEPHONE EVALUATION 21-30 MIN: CPT | Performed by: FAMILY MEDICINE

## 2024-10-22 PROCEDURE — 1159F MED LIST DOCD IN RCRD: CPT | Performed by: FAMILY MEDICINE

## 2024-10-22 PROCEDURE — 3048F LDL-C <100 MG/DL: CPT | Performed by: FAMILY MEDICINE

## 2024-10-22 ASSESSMENT — PATIENT HEALTH QUESTIONNAIRE - PHQ9
1. LITTLE INTEREST OR PLEASURE IN DOING THINGS: NOT AT ALL
SUM OF ALL RESPONSES TO PHQ9 QUESTIONS 1 AND 2: 0
2. FEELING DOWN, DEPRESSED OR HOPELESS: NOT AT ALL

## 2024-10-22 NOTE — ASSESSMENT & PLAN NOTE
Discussed lab results with patient.  Recheck fasting cholesterol labs in the spring.  Briefly discussed statin medication.

## 2024-10-22 NOTE — PROGRESS NOTES
Subjective   Chief complaint: Catherine L Bracht is a 69 y.o. female who presents for Results (Patient being called today to follow up on recent lab results).    HPI:  Virtual or Telephone Consent    A telephone visit (audio only) between the patient (at the originating site) and the provider (at the distant site) was utilized to provide this telehealth service.   Verbal consent was requested and obtained from Catherine L Bracht on this date, 10/22/24 for a telehealth visit.   Recently had blood work done and would like to discuss results together.  Having trouble scheduling her sleep study.  She did not do this because she does not want to wear her CPAP mask.  Would like assistance with getting it scheduled.  Her losartan was discontinued by cardiology because she had been coughing.  Reports that she got the flu and COVID vaccines recently.  Reports that she started taking over-the-counter magnesium and feels that it is helping her sleep better.  Recent A1c was 6.2%.            Objective   There were no vitals taken for this visit.  Physical Exam  Alert, pleasant, no acute distress  Respirations nonlabored, no dyspnea  Mood is positive and appropriate, no depression, no anxiety  Review of Systems   I have reviewed and reconciled the medication list with the patient today.   Current Outpatient Medications:     aspirin 81 mg EC tablet, Take 1 tablet (81 mg) by mouth once daily., Disp: , Rfl:     carvedilol (Coreg) 25 mg tablet, Take 2 tablets (50 mg) by mouth. Takes 1 in AM and 1 in PM (Patient taking differently: Take 1 tablet (25 mg) by mouth. Takes 1 in AM and 1 in PM), Disp: , Rfl:     chlorthalidone (Hygroton) 25 mg tablet, Take 1 tablet (25 mg) by mouth once daily., Disp: , Rfl:     cholecalciferol (Vitamin D-3) 50 mcg (2,000 unit) capsule, Take 1 capsule (50 mcg) by mouth early in the morning.., Disp: , Rfl:     clopidogrel (Plavix) 75 mg tablet, Take 1 tablet (75 mg) by mouth once daily., Disp: , Rfl:      inulin (FIBER GUMMIES ORAL), Take 1 tablet by mouth 3 times a day., Disp: , Rfl:     magnesium 250 mg tablet, Take 1 tablet (250 mg) by mouth once daily., Disp: , Rfl:     meclizine (Antivert) 12.5 mg tablet, Take 2 tablets (25 mg) by mouth once daily.  TAKE 1-2 TABLETS DAILY AS DIRECTED., Disp: , Rfl:     multivitamin tablet, Take 1 tablet by mouth once daily., Disp: , Rfl:     nitroglycerin (Nitrostat) 0.4 mg SL tablet, Place 1 tablet (0.4 mg) under the tongue every 5 minutes if needed for chest pain.  Dissolve 1 tablet under the tongue as needed. If no pain relief call 911., Disp: , Rfl:     vit C/Zn gluc/herbal no.325 (ELDERBERRY ZINC VIT C MM), Place 1 tablet into mouth between cheek and gum once daily., Disp: , Rfl:     acetaminophen (Tylenol) 500 mg tablet, Take 2 tablets (1,000 mg) by mouth every 8 hours if needed for mild pain (1 - 3)., Disp: , Rfl:      Imaging:  No results found.     Labs reviewed:    Lab Results   Component Value Date    WBC 8.0 10/04/2024    HGB 13.8 10/04/2024    HCT 42.3 10/04/2024     10/04/2024    CHOL 217 (H) 10/04/2024    TRIG 374 (H) 10/04/2024    HDL 47.9 10/04/2024    ALT 13 10/04/2024    AST 13 10/04/2024     10/04/2024    K 4.4 10/04/2024    CL 99 10/04/2024    CREATININE 0.77 10/04/2024    BUN 19 10/04/2024    CO2 32 10/04/2024    TSH 2.96 10/04/2024    HGBA1C 6.2 09/09/2024       Assessment/Plan   Problem List Items Addressed This Visit       Atherosclerosis of native coronary artery of native heart with stable angina pectoris     Has been followed by cardiology.         Cough in adult     Reports cough is better after stopping losartan.  Approved by cardiology.         Encounter to discuss test results     Reviewed lab/testing results with the patient and provided patient education regarding the results.           Hyperlipidemia, mixed     Discussed lab results with patient.  Recheck fasting cholesterol labs in the spring.  Briefly discussed statin  medication.         Relevant Orders    Comprehensive Metabolic Panel    Lipid Panel    Hypertension     Will recheck blood pressure at next office visit.         Relevant Orders    CBC and Auto Differential    Thyroid Stimulating Hormone    Loud snoring     Sleep study ordered.         Relevant Orders    Home sleep apnea test (HSAT)    Sleep apnea - Primary     Sleep study ordered.         Relevant Orders    Home sleep apnea test (HSAT)       Continue current medications as listed  Follow up in April following next blood test.  Return to the office sooner as needed.

## 2024-10-31 ENCOUNTER — PROCEDURE VISIT (OUTPATIENT)
Dept: SLEEP MEDICINE | Facility: HOSPITAL | Age: 69
End: 2024-10-31
Payer: COMMERCIAL

## 2024-10-31 DIAGNOSIS — R06.83 LOUD SNORING: ICD-10-CM

## 2024-10-31 DIAGNOSIS — G47.33 OBSTRUCTIVE SLEEP APNEA (ADULT) (PEDIATRIC): ICD-10-CM

## 2024-10-31 DIAGNOSIS — G47.30 SLEEP APNEA, UNSPECIFIED TYPE: ICD-10-CM

## 2024-11-05 DIAGNOSIS — G47.33 SEVERE OBSTRUCTIVE SLEEP APNEA: ICD-10-CM

## 2024-11-05 DIAGNOSIS — G47.00 INSOMNIA, UNSPECIFIED TYPE: ICD-10-CM

## 2024-11-05 DIAGNOSIS — R06.83 LOUD SNORING: ICD-10-CM

## 2024-11-19 DIAGNOSIS — I10 ESSENTIAL HYPERTENSION: ICD-10-CM

## 2024-11-19 RX ORDER — CARVEDILOL 25 MG/1
TABLET ORAL
Qty: 180 TABLET | Refills: 1 | Status: SHIPPED | OUTPATIENT
Start: 2024-11-19

## 2024-11-19 RX ORDER — CLOPIDOGREL BISULFATE 75 MG/1
75 TABLET ORAL DAILY
Qty: 90 TABLET | Refills: 1 | Status: SHIPPED | OUTPATIENT
Start: 2024-11-19

## 2024-11-19 NOTE — TELEPHONE ENCOUNTER
Requesting medication refill. Please approve or deny this request.    Rx requested:  Requested Prescriptions     Pending Prescriptions Disp Refills    clopidogrel (PLAVIX) 75 MG tablet [Pharmacy Med Name: Clopidogrel Bisulfate 75 MG Oral Tablet] 90 tablet 0     Sig: Take 1 tablet by mouth once daily    carvedilol (COREG) 25 MG tablet [Pharmacy Med Name: Carvedilol 25 MG Oral Tablet] 180 tablet 0     Sig: TAKE 1 TABLET BY MOUTH TWICE DAILY WITH MEALS         Last Office Visit:   6/4/2024      Next Visit Date:  Future Appointments   Date Time Provider Department Center   6/10/2025  9:00 AM Yandel Connor DO [facility]               Last refill 12/10/2023. Please approve or deny.

## 2025-03-10 ENCOUNTER — APPOINTMENT (OUTPATIENT)
Dept: PRIMARY CARE | Facility: CLINIC | Age: 70
End: 2025-03-10
Payer: COMMERCIAL

## 2025-04-23 LAB
ALBUMIN SERPL-MCNC: 4.4 G/DL (ref 3.6–5.1)
ALP SERPL-CCNC: 50 U/L (ref 37–153)
ALT SERPL-CCNC: 15 U/L (ref 6–29)
ANION GAP SERPL CALCULATED.4IONS-SCNC: 12 MMOL/L (CALC) (ref 7–17)
AST SERPL-CCNC: 17 U/L (ref 10–35)
BASOPHILS # BLD AUTO: 29 CELLS/UL (ref 0–200)
BASOPHILS NFR BLD AUTO: 0.4 %
BILIRUB SERPL-MCNC: 0.5 MG/DL (ref 0.2–1.2)
BUN SERPL-MCNC: 19 MG/DL (ref 7–25)
CALCIUM SERPL-MCNC: 10 MG/DL (ref 8.6–10.4)
CHLORIDE SERPL-SCNC: 98 MMOL/L (ref 98–110)
CHOLEST SERPL-MCNC: 245 MG/DL
CHOLEST/HDLC SERPL: 4.8 (CALC)
CO2 SERPL-SCNC: 28 MMOL/L (ref 20–32)
CREAT SERPL-MCNC: 0.67 MG/DL (ref 0.6–1)
EGFRCR SERPLBLD CKD-EPI 2021: 93 ML/MIN/1.73M2
EOSINOPHIL # BLD AUTO: 263 CELLS/UL (ref 15–500)
EOSINOPHIL NFR BLD AUTO: 3.6 %
ERYTHROCYTE [DISTWIDTH] IN BLOOD BY AUTOMATED COUNT: 13.8 % (ref 11–15)
GLUCOSE SERPL-MCNC: 127 MG/DL (ref 65–99)
HCT VFR BLD AUTO: 41.4 % (ref 35–45)
HDLC SERPL-MCNC: 51 MG/DL
HGB BLD-MCNC: 14 G/DL (ref 11.7–15.5)
LDLC SERPL CALC-MCNC: 136 MG/DL (CALC)
LYMPHOCYTES # BLD AUTO: 2876 CELLS/UL (ref 850–3900)
LYMPHOCYTES NFR BLD AUTO: 39.4 %
MCH RBC QN AUTO: 29.6 PG (ref 27–33)
MCHC RBC AUTO-ENTMCNC: 33.8 G/DL (ref 32–36)
MCV RBC AUTO: 87.5 FL (ref 80–100)
MONOCYTES # BLD AUTO: 613 CELLS/UL (ref 200–950)
MONOCYTES NFR BLD AUTO: 8.4 %
NEUTROPHILS # BLD AUTO: 3519 CELLS/UL (ref 1500–7800)
NEUTROPHILS NFR BLD AUTO: 48.2 %
NONHDLC SERPL-MCNC: 194 MG/DL (CALC)
PLATELET # BLD AUTO: 257 THOUSAND/UL (ref 140–400)
PMV BLD REES-ECKER: 11 FL (ref 7.5–12.5)
POTASSIUM SERPL-SCNC: 4.3 MMOL/L (ref 3.5–5.3)
PROT SERPL-MCNC: 7.1 G/DL (ref 6.1–8.1)
RBC # BLD AUTO: 4.73 MILLION/UL (ref 3.8–5.1)
SODIUM SERPL-SCNC: 138 MMOL/L (ref 135–146)
TRIGL SERPL-MCNC: 375 MG/DL
TSH SERPL-ACNC: 2.39 MIU/L (ref 0.4–4.5)
WBC # BLD AUTO: 7.3 THOUSAND/UL (ref 3.8–10.8)

## 2025-04-29 ENCOUNTER — APPOINTMENT (OUTPATIENT)
Dept: PRIMARY CARE | Facility: CLINIC | Age: 70
End: 2025-04-29
Payer: COMMERCIAL

## 2025-04-29 ENCOUNTER — HOSPITAL ENCOUNTER (OUTPATIENT)
Dept: RADIOLOGY | Facility: HOSPITAL | Age: 70
Discharge: HOME | End: 2025-04-29
Payer: MEDICARE

## 2025-04-29 VITALS
BODY MASS INDEX: 38.16 KG/M2 | DIASTOLIC BLOOD PRESSURE: 80 MMHG | HEIGHT: 62 IN | HEART RATE: 89 BPM | WEIGHT: 207.4 LBS | SYSTOLIC BLOOD PRESSURE: 126 MMHG | TEMPERATURE: 98.1 F | OXYGEN SATURATION: 99 %

## 2025-04-29 DIAGNOSIS — M25.552 HIP PAIN, LEFT: ICD-10-CM

## 2025-04-29 DIAGNOSIS — E66.01 MORBID OBESITY (MULTI): ICD-10-CM

## 2025-04-29 DIAGNOSIS — Z00.00 MEDICARE ANNUAL WELLNESS VISIT, SUBSEQUENT: Primary | ICD-10-CM

## 2025-04-29 DIAGNOSIS — Z00.00 ROUTINE GENERAL MEDICAL EXAMINATION AT HEALTH CARE FACILITY: ICD-10-CM

## 2025-04-29 DIAGNOSIS — E11.65 TYPE 2 DIABETES MELLITUS WITH HYPERGLYCEMIA, WITHOUT LONG-TERM CURRENT USE OF INSULIN: ICD-10-CM

## 2025-04-29 LAB — POC HEMOGLOBIN A1C: 6.6 % (ref 4.2–6.5)

## 2025-04-29 PROCEDURE — G0439 PPPS, SUBSEQ VISIT: HCPCS | Performed by: FAMILY MEDICINE

## 2025-04-29 PROCEDURE — 1170F FXNL STATUS ASSESSED: CPT | Performed by: FAMILY MEDICINE

## 2025-04-29 PROCEDURE — 73502 X-RAY EXAM HIP UNI 2-3 VIEWS: CPT | Mod: LEFT SIDE | Performed by: RADIOLOGY

## 2025-04-29 PROCEDURE — 3044F HG A1C LEVEL LT 7.0%: CPT | Performed by: FAMILY MEDICINE

## 2025-04-29 PROCEDURE — 3079F DIAST BP 80-89 MM HG: CPT | Performed by: FAMILY MEDICINE

## 2025-04-29 PROCEDURE — 1123F ACP DISCUSS/DSCN MKR DOCD: CPT | Performed by: FAMILY MEDICINE

## 2025-04-29 PROCEDURE — 99397 PER PM REEVAL EST PAT 65+ YR: CPT | Performed by: FAMILY MEDICINE

## 2025-04-29 PROCEDURE — G0444 DEPRESSION SCREEN ANNUAL: HCPCS | Performed by: FAMILY MEDICINE

## 2025-04-29 PROCEDURE — 3008F BODY MASS INDEX DOCD: CPT | Performed by: FAMILY MEDICINE

## 2025-04-29 PROCEDURE — 1158F ADVNC CARE PLAN TLK DOCD: CPT | Performed by: FAMILY MEDICINE

## 2025-04-29 PROCEDURE — 99213 OFFICE O/P EST LOW 20 MIN: CPT | Performed by: FAMILY MEDICINE

## 2025-04-29 PROCEDURE — 1157F ADVNC CARE PLAN IN RCRD: CPT | Performed by: FAMILY MEDICINE

## 2025-04-29 PROCEDURE — 3074F SYST BP LT 130 MM HG: CPT | Performed by: FAMILY MEDICINE

## 2025-04-29 PROCEDURE — 83036 HEMOGLOBIN GLYCOSYLATED A1C: CPT | Performed by: FAMILY MEDICINE

## 2025-04-29 PROCEDURE — 73502 X-RAY EXAM HIP UNI 2-3 VIEWS: CPT | Mod: LT

## 2025-04-29 PROCEDURE — 1159F MED LIST DOCD IN RCRD: CPT | Performed by: FAMILY MEDICINE

## 2025-04-29 PROCEDURE — 3288F FALL RISK ASSESSMENT DOCD: CPT | Performed by: FAMILY MEDICINE

## 2025-04-29 ASSESSMENT — ACTIVITIES OF DAILY LIVING (ADL)
DOING_HOUSEWORK: NEEDS ASSISTANCE
MANAGING_FINANCES: INDEPENDENT
BATHING: INDEPENDENT
GROCERY_SHOPPING: INDEPENDENT
DRESSING: INDEPENDENT
TAKING_MEDICATION: INDEPENDENT

## 2025-04-29 ASSESSMENT — ENCOUNTER SYMPTOMS
OCCASIONAL FEELINGS OF UNSTEADINESS: 1
LOSS OF SENSATION IN FEET: 1

## 2025-04-29 NOTE — PROGRESS NOTES
Subjective :  Chief Complaint: Catherine L Bracht is an 70 y.o. female here for an annual wellness visit and general medical care and f/u.     HPI:  Here for an annual wellness visit.  Medications:  no concerns.  Down to 1- 1/2 carvedilol   Diet:  discussed healthy guidelines  Exercise:  has a [employer] watch.  It is helping her walk more.  Hard to walk with left hip pain.  Discussed healthy guidelines.  Sleep:  better, getting 5 hours at night.  Wakes early.  Mood:  hip pain affects her mood. No depression, no suicidal ideation, has a support network, Depression screening completed using the PHQ-2 screening tool.  See rooming flow sheet for documentation.  5 minutes spent.  Ophthalmology:  up to date  Dental:  no concerns, due for an apt in a couple month  Advanced Directives:  has a living will and POA  Falls: had a fall.  Does not want to have therapy for balance nor strengthening.  Mammograms:  up to date  History of gestational diabetes.  Watching her sugars.      REVIEW OF SYSTEMS:  Constitutional:  No fever nor chills.  No significant weight changes.    Eyes: No vision changes.  Sclerae clear.  ENT:  No hearing changes.  No nasal congestion.    Cardiovascular:  No chest pains, palpitations, or dyspnea on exertion.  Has not had to take her nitroglycerine.  Respiratory:  No cough or shortness of breath.  No wheezing.   GI:  No bowel habit changes. No nausea or vomiting.  Fiber gummies has helped  her loose stools very well.    MS:  Sometimes muscle and joint pains.  Hip pain.  Has a lot of back pain / problems.  Lymphatics:  No swelling.  CNS:  No weakness.  No tingling.  Feet are numb, variable.  No gait change.  Psychiatric: No depression, no anxiety.  Mood is positive and appropriate.            Review of Systems  All systems reviewed and negative except for what was mentioned in the HPI    Objective   /80 (BP Location: Right arm, Patient Position: Sitting, BP Cuff Size: Large adult)   Pulse 89   Temp  "36.7 °C (98.1 °F) (Temporal)   Ht 1.575 m (5' 2\")   Wt 94.1 kg (207 lb 6.4 oz)   SpO2 99% Comment: RA  BMI 37.93 kg/m²     Physical Exam  General:  Alert, oriented, no acute distress  Eyes:  Sclerae white, PER, conjunctivae clear  ENT:  No nasal congestion.    Neck: Supple  Endocrine:  No thyromegaly. HgbA1c today was 6.6%.  Respiratory:  Normal breath sounds.  No wheezing, rhonchi nor crackles.  No dyspnea.  Cardiovascular:  S1 and S2 positive.  Regular rate and rhythm.  No gallops.  No murmurs.  Vascular:  No edema.  Skin warm and dry.  CNS:  Slower gait and limp with left hip pain.  Rises from a chair using her hands to push off.  Generalized weakness.   Psychiatric:  Affect is positive and appropriate.  No depression.  No anxiety.    Imaging:  Imaging  No results found.    Cardiology, Vascular, and Other Imaging  No other imaging results found for the past 7 days       Labs reviewed:    Lab Results   Component Value Date    WBC 7.3 04/22/2025    RBC 4.73 04/22/2025    HGB 14.0 04/22/2025    HCT 41.4 04/22/2025    MCV 87.5 04/22/2025     04/22/2025    CHOL 245 (H) 04/22/2025    TRIG 375 (H) 04/22/2025    HDL 51 04/22/2025    ALT 15 04/22/2025    AST 17 04/22/2025     04/22/2025    K 4.3 04/22/2025    CL 98 04/22/2025    CREATININE 0.67 04/22/2025    BUN 19 04/22/2025    CO2 28 04/22/2025    TSH 2.39 04/22/2025    HGBA1C 6.2 09/09/2024       Past Medical, Surgical, and Family History reviewed and updated in chart.    I have reviewed and reconciled the medication list with the patient today. Current Medications[1]     List of current healthcare providers:  Patient Care Team:  Josette Mahmood MD as PCP - General  Josette Mahmood MD as PCP - [employer] PCP     Assessment/Plan :  Problem List Items Addressed This Visit       Hip pain, left    Pain is getting worse for her.  Xray ordered.         Relevant Orders    XR hip left with pelvis when performed 2 or 3 views    " Medicare annual wellness visit, subsequent - Primary    Age appropriate preventive measures reviewed and discussed.  Diet and exercise recommendations discussed.             Morbid obesity (Multi)    Diet and exercise recommendations discussed.             Type 2 diabetes mellitus with hyperglycemia, without long-term current use of insulin    HgbA1c today is 6.6%.  Continue present management.         Relevant Orders    POCT glycosylated hemoglobin (Hb A1C) manually resulted     Other Visit Diagnoses         Routine general medical examination at health care facility        Relevant Orders    1 Year Follow Up In Primary Care - Wellness Exam          The following health maintenance schedule was reviewed with the patient and provided in printed form in the after visit summary:  Health Maintenance   Topic Date Due    Diabetes: Retinopathy Screening  Never done    Zoster Vaccines (1 of 2) Never done    RSV High Risk: (Elderly (60+) or Pregnant Population) (1 - Risk 60-74 years 1-dose series) Never done    Diabetes: Hemoglobin A1C  12/09/2024    COVID-19 Vaccine (7 - 2024-25 season) 12/10/2024    Mammogram  08/01/2025    Diabetes: Urine Protein Screening  10/04/2025    Lipid Panel  04/22/2026    Medicare Annual Wellness Visit (AWV)  04/30/2026    Colorectal Cancer Screening  03/16/2032    DTaP/Tdap/Td Vaccines (3 - Td or Tdap) 10/24/2033    Influenza Vaccine  Completed    Pneumococcal Vaccine  Completed    Hepatitis C Screening  Completed    Bone Density Scan  Completed    HIB Vaccines  Aged Out    Hepatitis B Vaccines  Aged Out    IPV Vaccines  Aged Out    Hepatitis A Vaccines  Aged Out    Meningococcal Vaccine  Aged Out    Rotavirus Vaccines  Aged Out    HPV Vaccines  Aged Out    Welcome to Medicare Visit  Discontinued    Irritable Bowel Syndrome  Discontinued       Advance Care Planning   As above.        Reviewed lab/testing results with the patient and provided patient education regarding the results.  Continue  current medications as listed  Follow up in one year for next wellness visit.     Patient was identified as a fall risk. Risk prevention instructions provided.  Offered physical therapy for balance and strength training.  Patient declined.         [1]   Current Outpatient Medications:     acetaminophen (Tylenol) 500 mg tablet, Take 2 tablets (1,000 mg) by mouth every 8 hours if needed for mild pain (1 - 3)., Disp: , Rfl:     aspirin 81 mg EC tablet, Take 1 tablet (81 mg) by mouth once daily., Disp: , Rfl:     carvedilol (Coreg) 25 mg tablet, Take 2 tablets (50 mg) by mouth. Takes 1 in AM and 1 in PM, Disp: , Rfl:     chlorthalidone (Hygroton) 25 mg tablet, Take 1 tablet (25 mg) by mouth once daily., Disp: , Rfl:     cholecalciferol (Vitamin D-3) 50 mcg (2,000 unit) capsule, Take 1 capsule (2,000 Units) by mouth early in the morning.., Disp: , Rfl:     clopidogrel (Plavix) 75 mg tablet, Take 1 tablet (75 mg) by mouth once daily., Disp: , Rfl:     inulin (FIBER GUMMIES ORAL), Take 1 tablet by mouth 3 times a day., Disp: , Rfl:     magnesium 250 mg tablet, Take 1 tablet (250 mg) by mouth once daily., Disp: , Rfl:     nitroglycerin (Nitrostat) 0.4 mg SL tablet, Place 1 tablet (0.4 mg) under the tongue every 5 minutes if needed for chest pain.  Dissolve 1 tablet under the tongue as needed. If no pain relief call 911., Disp: , Rfl:     vit C/Zn gluc/herbal no.325 (ELDERBERRY ZINC VIT C MM), Place 1 tablet into mouth between cheek and gum once daily., Disp: , Rfl:     meclizine (Antivert) 12.5 mg tablet, Take 2 tablets (25 mg) by mouth once daily.  TAKE 1-2 TABLETS DAILY AS DIRECTED., Disp: , Rfl:     multivitamin tablet, Take 1 tablet by mouth once daily. (Patient not taking: Reported on 4/29/2025), Disp: , Rfl:

## 2025-05-21 DIAGNOSIS — I10 ESSENTIAL HYPERTENSION: ICD-10-CM

## 2025-05-21 DIAGNOSIS — E11.65 TYPE 2 DIABETES MELLITUS WITH HYPERGLYCEMIA, WITHOUT LONG-TERM CURRENT USE OF INSULIN: ICD-10-CM

## 2025-05-21 RX ORDER — CARVEDILOL 25 MG/1
25 TABLET ORAL 2 TIMES DAILY WITH MEALS
Qty: 180 TABLET | Refills: 0 | Status: SHIPPED | OUTPATIENT
Start: 2025-05-21

## 2025-05-21 RX ORDER — CLOPIDOGREL BISULFATE 75 MG/1
75 TABLET ORAL DAILY
Qty: 90 TABLET | Refills: 0 | Status: SHIPPED | OUTPATIENT
Start: 2025-05-21

## 2025-05-21 NOTE — TELEPHONE ENCOUNTER
Requesting medication refill. Please approve or deny this request.    Rx requested:  Requested Prescriptions     Pending Prescriptions Disp Refills    carvedilol (COREG) 25 MG tablet [Pharmacy Med Name: Carvedilol 25 MG Oral Tablet] 180 tablet 0     Sig: TAKE 1 TABLET BY MOUTH TWICE DAILY WITH MEALS    clopidogrel (PLAVIX) 75 MG tablet [Pharmacy Med Name: Clopidogrel Bisulfate 75 MG Oral Tablet] 90 tablet 0     Sig: Take 1 tablet by mouth once daily         Last Office Visit:   6/4/2024      Next Visit Date:  Future Appointments   Date Time Provider Department Center   6/10/2025  9:00 AM Yandel Connor DO [facility]

## 2025-06-10 ENCOUNTER — OFFICE VISIT (OUTPATIENT)
Dept: CARDIOLOGY CLINIC | Age: 70
End: 2025-06-10
Payer: MEDICARE

## 2025-06-10 VITALS
HEIGHT: 62 IN | BODY MASS INDEX: 38.46 KG/M2 | HEART RATE: 69 BPM | DIASTOLIC BLOOD PRESSURE: 70 MMHG | WEIGHT: 209 LBS | SYSTOLIC BLOOD PRESSURE: 120 MMHG

## 2025-06-10 DIAGNOSIS — R09.89 LEFT CAROTID BRUIT: ICD-10-CM

## 2025-06-10 DIAGNOSIS — Z78.9 STATIN INTOLERANCE: ICD-10-CM

## 2025-06-10 DIAGNOSIS — I25.10 CORONARY ARTERY DISEASE INVOLVING NATIVE CORONARY ARTERY OF NATIVE HEART WITHOUT ANGINA PECTORIS: Primary | ICD-10-CM

## 2025-06-10 DIAGNOSIS — I73.9 PAD (PERIPHERAL ARTERY DISEASE): ICD-10-CM

## 2025-06-10 DIAGNOSIS — I25.10 CHRONIC CORONARY ARTERY DISEASE: ICD-10-CM

## 2025-06-10 DIAGNOSIS — E66.9 OBESITY (BMI 30-39.9): ICD-10-CM

## 2025-06-10 DIAGNOSIS — E78.2 MIXED HYPERLIPIDEMIA: ICD-10-CM

## 2025-06-10 DIAGNOSIS — I10 PRIMARY HYPERTENSION: ICD-10-CM

## 2025-06-10 PROCEDURE — 99214 OFFICE O/P EST MOD 30 MIN: CPT | Performed by: INTERNAL MEDICINE

## 2025-06-10 PROCEDURE — 1126F AMNT PAIN NOTED NONE PRSNT: CPT | Performed by: INTERNAL MEDICINE

## 2025-06-10 PROCEDURE — 93000 ELECTROCARDIOGRAM COMPLETE: CPT | Performed by: INTERNAL MEDICINE

## 2025-06-10 PROCEDURE — 1159F MED LIST DOCD IN RCRD: CPT | Performed by: INTERNAL MEDICINE

## 2025-06-10 PROCEDURE — 3074F SYST BP LT 130 MM HG: CPT | Performed by: INTERNAL MEDICINE

## 2025-06-10 PROCEDURE — 1123F ACP DISCUSS/DSCN MKR DOCD: CPT | Performed by: INTERNAL MEDICINE

## 2025-06-10 PROCEDURE — 3078F DIAST BP <80 MM HG: CPT | Performed by: INTERNAL MEDICINE

## 2025-06-10 NOTE — PROGRESS NOTES
(BMI 30-39.9)        6. Statin intolerance        7. Chronic coronary artery disease        8. Left carotid bruit  Vascular duplex carotid bilateral          PLAN:         As always, aggressive risk factor modification is strongly recommended. We should adhere to the JNC VII guidelines for HTN management and the NCEP ATP III guidelines for LDL-C management.    Cardiac diet is always recommended with low fat, cholesterol, calories and sodium.    Continue medications at current doses.     Consider cardiac catheterization if warranted by symptoms    Check carotid ultrasound for possible left carotid bruit    Cont with [facility]AP[facility]. Ok to hold 5-7 days prior to procedures.     Does not want Repatha.  Follow-up with PCP for lipid profile  Recommend taking Zetia  Also recommend for patient to take fish oil/Tricor/Vascepa for hypertriglyceridemia.  She is to discuss with her family doctor    CPAP qhs    Weight loss discussed.     Check EKG    Patient was advised and encouraged to check blood pressure at home or at a pharmacy, maintain a logbook, and also call us back if blood pressure are above the target ranges or if it is low. Patient clearly understands and agrees to the instructions.     We will need to continue to monitor muscle and liver enzymes, BUN, CR, and electrolytes.    Details of medical condition explained and patient was warned about adverse consequences of uncontrolled medical conditions and possible side effects of prescribed medications.

## 2025-06-17 LAB
ALBUMIN/CREAT UR: 43 MG/G CREAT
CREAT UR-MCNC: 189 MG/DL (ref 20–275)
MICROALBUMIN UR-MCNC: 8.1 MG/DL

## 2025-06-18 ENCOUNTER — HOSPITAL ENCOUNTER (OUTPATIENT)
Dept: ULTRASOUND IMAGING | Age: 70
Discharge: HOME | End: 2025-06-20
Attending: INTERNAL MEDICINE
Payer: MEDICARE

## 2025-06-18 DIAGNOSIS — R09.89 LEFT CAROTID BRUIT: ICD-10-CM

## 2025-06-18 PROCEDURE — 93880 EXTRACRANIAL BILAT STUDY: CPT

## 2025-06-20 LAB
VAS LEFT CCA DIST EDV: 15.7 CM/S
VAS LEFT CCA DIST PSV: 92.5 CM/S
VAS LEFT CCA MID EDV: 19.6 CM/S
VAS LEFT CCA MID PSV: 95.6 CM/S
VAS LEFT CCA PROX EDV: 29 CM/S
VAS LEFT CCA PROX PSV: 131 CM/S
VAS LEFT ECA EDV: 14.9 CM/S
VAS LEFT ECA PSV: 103 CM/S
VAS LEFT ICA DIST EDV: 30.3 CM/S
VAS LEFT ICA DIST PSV: 107 CM/S
VAS LEFT ICA MID EDV: 27.5 CM/S
VAS LEFT ICA MID PSV: 102 CM/S
VAS LEFT ICA PROX EDV: 28.1 CM/S
VAS LEFT ICA PROX PSV: 105 CM/S
VAS LEFT ICA/CCA PSV: 1.12
VAS LEFT VERTEBRAL EDV: 17.6 CM/S
VAS LEFT VERTEBRAL PSV: 49.4 CM/S
VAS RIGHT CCA DIST EDV: 19.9 CM/S
VAS RIGHT CCA DIST PSV: 108 CM/S
VAS RIGHT CCA MID EDV: 14.7 CM/S
VAS RIGHT CCA MID PSV: 117 CM/S
VAS RIGHT CCA PROX EDV: 9.5 CM/S
VAS RIGHT CCA PROX PSV: 105 CM/S
VAS RIGHT ECA EDV: 11.3 CM/S
VAS RIGHT ECA PSV: 118 CM/S
VAS RIGHT ICA DIST EDV: 21.7 CM/S
VAS RIGHT ICA DIST PSV: 93.9 CM/S
VAS RIGHT ICA MID EDV: 24.5 CM/S
VAS RIGHT ICA MID PSV: 99.6 CM/S
VAS RIGHT ICA PROX EDV: 23.8 CM/S
VAS RIGHT ICA PROX PSV: 75 CM/S
VAS RIGHT ICA/CCA PSV: 0.85
VAS RIGHT VERTEBRAL EDV: 14.8 CM/S
VAS RIGHT VERTEBRAL PSV: 52.1 CM/S

## 2025-06-20 PROCEDURE — 93880 EXTRACRANIAL BILAT STUDY: CPT | Performed by: INTERNAL MEDICINE

## 2025-06-23 ENCOUNTER — RESULTS FOLLOW-UP (OUTPATIENT)
Age: 70
End: 2025-06-23

## 2025-08-30 DIAGNOSIS — I10 ESSENTIAL HYPERTENSION: ICD-10-CM

## 2025-09-02 RX ORDER — CLOPIDOGREL BISULFATE 75 MG/1
75 TABLET ORAL DAILY
Qty: 90 TABLET | Refills: 2 | Status: SHIPPED | OUTPATIENT
Start: 2025-09-02

## 2025-09-02 RX ORDER — CARVEDILOL 25 MG/1
25 TABLET ORAL 2 TIMES DAILY WITH MEALS
Qty: 180 TABLET | Refills: 2 | Status: SHIPPED | OUTPATIENT
Start: 2025-09-02

## 2025-09-02 RX ORDER — CHLORTHALIDONE 25 MG/1
25 TABLET ORAL DAILY
Qty: 90 TABLET | Refills: 2 | Status: SHIPPED | OUTPATIENT
Start: 2025-09-02

## 2026-05-01 ENCOUNTER — APPOINTMENT (OUTPATIENT)
Dept: PRIMARY CARE | Facility: CLINIC | Age: 71
End: 2026-05-01
Payer: MEDICARE